# Patient Record
Sex: FEMALE | NOT HISPANIC OR LATINO | Employment: FULL TIME | URBAN - METROPOLITAN AREA
[De-identification: names, ages, dates, MRNs, and addresses within clinical notes are randomized per-mention and may not be internally consistent; named-entity substitution may affect disease eponyms.]

---

## 2017-03-30 DIAGNOSIS — F98.8 ATTENTION DEFICIT DISORDER: ICD-10-CM

## 2017-03-30 NOTE — TELEPHONE ENCOUNTER
Patient is requesting a refill of the following meds:  Pending Prescriptions:                       Disp   Refills    lisdexamfetamine (VYVANSE) 30 MG capsule  30 cap*0            Sig: Take 1 capsule (30 mg) by mouth every morning    lisdexamfetamine (VYVANSE) 30 MG capsule  30 cap*1            Sig: Take 1 capsule (30 mg) by mouth every morning    Last Refill: 11/21/16  Last Office Visit: 11/21/16 (with instructions for a 6 months follow up, pt. Should be ok for refills for the next 2 months)  Scheduled Office Visit: None Scheduled    Please Review and Advise    Thank You,  Amparo Christie, CMA

## 2017-03-31 ENCOUNTER — MYC MEDICAL ADVICE (OUTPATIENT)
Dept: FAMILY MEDICINE | Facility: CLINIC | Age: 37
End: 2017-03-31

## 2017-03-31 DIAGNOSIS — F98.8 ATTENTION DEFICIT DISORDER: ICD-10-CM

## 2017-03-31 RX ORDER — LISDEXAMFETAMINE DIMESYLATE 30 MG/1
30 CAPSULE ORAL EVERY MORNING
Qty: 30 CAPSULE | Refills: 0 | Status: CANCELLED | OUTPATIENT
Start: 2017-04-30 | End: 2017-05-31

## 2017-03-31 NOTE — TELEPHONE ENCOUNTER
I have attempted to contact this patient by phone with the following results: left message to return my call on answering machine.    Patient Call Back Info:  [phone] (home)     Amparo Christie CMA

## 2017-04-01 RX ORDER — LISDEXAMFETAMINE DIMESYLATE 30 MG/1
30 CAPSULE ORAL EVERY MORNING
Qty: 90 CAPSULE | Refills: 0 | Status: SHIPPED | OUTPATIENT
Start: 2017-04-30 | End: 2017-04-03

## 2017-04-01 NOTE — TELEPHONE ENCOUNTER
Faxed to pharmacy - [facility] in [place]  Called Pt and informed her of the fax.   JOHN Win ([facility])

## 2017-04-03 RX ORDER — LISDEXAMFETAMINE DIMESYLATE 30 MG/1
30 CAPSULE ORAL EVERY MORNING
Qty: 90 CAPSULE | Refills: 0 | Status: SHIPPED | OUTPATIENT
Start: 2017-04-30 | End: 2017-08-24

## 2017-04-03 NOTE — TELEPHONE ENCOUNTER
From: Dennys Escudero  Sent: 3/31/2017 10:02 PM CDT  To[facility]  Subject: RE:Refill Request    90 stabs for 3 months  ----- Message -----  From: Amparo Christie CMA  Sent: 3/31/2017 11:55 AM CDT  To: Dennys WHITRajendra Escudero  Subject: Refill Request    Lazaramary,    I received your message requesting a refill of you Vyvanse.  Dr Grover would like to know if you want 90 tabs for a 3 month supply or 3 different prescriptions with a 30 day supply on each one, also equaling 3 months.  Which would you prefer so we can put in the appropriate order.    Thank You,  Amparo Christie CMA

## 2017-04-04 NOTE — TELEPHONE ENCOUNTER
Prescription has been filled.   Pt has been called.   Prescription is in the Red binder.   Riana.JOHN Newman ([facility])

## 2017-08-24 ENCOUNTER — OFFICE VISIT (OUTPATIENT)
Dept: FAMILY MEDICINE | Facility: CLINIC | Age: 37
End: 2017-08-24

## 2017-08-24 VITALS
HEART RATE: 76 BPM | WEIGHT: 169.6 LBS | OXYGEN SATURATION: 99 % | SYSTOLIC BLOOD PRESSURE: 100 MMHG | HEIGHT: 55 IN | BODY MASS INDEX: 39.25 KG/M2 | TEMPERATURE: 98.3 F | DIASTOLIC BLOOD PRESSURE: 72 MMHG

## 2017-08-24 DIAGNOSIS — F90.2 ATTENTION DEFICIT HYPERACTIVITY DISORDER (ADHD), COMBINED TYPE: ICD-10-CM

## 2017-08-24 PROCEDURE — 99213 OFFICE O/P EST LOW 20 MIN: CPT | Performed by: FAMILY MEDICINE

## 2017-08-24 RX ORDER — LISDEXAMFETAMINE DIMESYLATE 30 MG/1
30 CAPSULE ORAL EVERY MORNING
Qty: 90 CAPSULE | Refills: 0 | Status: SHIPPED | OUTPATIENT
Start: 2017-08-24 | End: 2018-01-15

## 2017-08-24 NOTE — PROGRESS NOTES
"SUBJECTIVE:  Dennys Escudero is 37 year old  who is being treated for ADHD.    Are your symptoms controlled?   YES  Are you satisfied with your current medication dosage? YES  Are you taking your medication regularly?YES  Are you experiencing any insomnia? rarely  Are you experiencing any jitteriness? No  Are you experiencing any loss of appetite or weight loss? No  Are you experiencing any other side effects? No     Might take medication on weekends  Takes if she needs to get things done    Likes Vyvanse better than adderall/ less dramatic on and off symptoms  Less \"jittery\" than with other medications    Stopped exercising and has some weight gain  More overtime at work  Healthy lifestyle encouraged        ASSESSMENT:  ADHD- control is good    PLAN:  Refill for 90 days- one refill before next visit in six months  "

## 2017-08-24 NOTE — MR AVS SNAPSHOT
"              After Visit Summary   8/24/2017    Dennys Escudero    MRN: [id number]           Patient Information     Date Of Birth          1980        Visit Information        Provider Department      8/24/2017 11:45 AM Alicia Grover MD [facility]        Today's Diagnoses     Attention deficit hyperactivity disorder (ADHD), combined type           Follow-ups after your visit        Who to contact     If you have questions or need follow up information about today's clinic visit or your schedule please contact [facility] directly at [phone].  Normal or non-critical lab and imaging results will be communicated to you by [employer], letter or phone within 4 business days after the clinic has received the results. If you do not hear from us within 7 days, please contact the clinic through [employer] or phone. If you have a critical or abnormal lab result, we will notify you by phone as soon as possible.  Submit refill requests through [employer] or call your pharmacy and they will forward the refill request to us. Please allow 3 business days for your refill to be completed.          Additional Information About Your Visit        MyChart Information     [employer] gives you secure access to your electronic health record. If you see a primary care provider, you can also send messages to your care team and make appointments. If you have questions, please call your primary care clinic.  If you do not have a primary care provider, please call [phone] and they will assist you.        Care EveryWhere ID     This is your Care EveryWhere ID. This could be used by other organizations to access your [place] medical records  [id number]        Your Vitals Were     Pulse Temperature Height Last Period Pulse Oximetry Breastfeeding?    76 98.3  F (36.8  C) (Oral) 1.321 m (4' 4\") 07/01/2005 99% No    BMI (Body Mass Index)                   44.1 kg/m2            Blood Pressure " from Last 3 Encounters:   08/24/17 100/72   11/21/16 98/70   03/21/16 92/66    Weight from Last 3 Encounters:   08/24/17 76.9 kg (169 lb 9.6 oz)   11/21/16 72.4 kg (159 lb 9.6 oz)   03/21/16 77.3 kg (170 lb 6.4 oz)              Today, you had the following     No orders found for display         Where to get your medicines      Some of these will need a paper prescription and others can be bought over the counter.  Ask your nurse if you have questions.     Bring a paper prescription for each of these medications     lisdexamfetamine 30 MG capsule          Primary Care Provider Office Phone # Fax #    Alicia Grover -[phone] [address] [place] [address]        Equal Access to Services     SUHAIL HOLLAND : Hadii enedina hinojosa hadasho Soomaali, waaxda luqadaha, qaybta kaalmada hany, rosangela rothman . So [facility] [phone].    ATENCIÓN: Si habla español, tiene a lazcano disposición servicios gratuitos de asistencia lingüística. Llame al [phone].    We comply with applicable federal civil rights laws and [place] laws. We do not discriminate on the basis of race, color, national origin, age, disability sex, sexual orientation or gender identity.            Thank you!     Thank you for choosing [facility]  for your care. Our goal is always to provide you with excellent care. Hearing back from our patients is one way we can continue to improve our services. Please take a few minutes to complete the written survey that you may receive in the mail after your visit with us. Thank you!             Your Updated Medication List - Protect others around you: Learn how to safely use, store and throw away your medicines at www.disposemymeds.org.          This list is accurate as of: 8/24/17 11:59 PM.  Always use your most recent med list.                   Brand Name Dispense Instructions for use Diagnosis    lisdexamfetamine 30 MG  capsule    VYVANSE    90 capsule    Take 1 capsule (30 mg) by mouth every morning    Attention deficit hyperactivity disorder (ADHD), combined type

## 2017-08-24 NOTE — NURSING NOTE
Dennys is here for a medication recheck.     Pre-Visit Screening :  Immunizations : up to date    Colonoscopy : NA  Mammogram : NA  Asthma Action Test/Plan : NA  PHQ9/GAD7 :  NA    Pulse - regular  My Chart - accepts    CLASSIFICATION OF OVERWEIGHT AND OBESITY BY BMI                         Obesity Class           BMI(kg/m2)  Underweight                                    < 18.5  Normal                                         18.5-24.9  Overweight                                     25.0-29.9  OBESITY                     I                  30.0-34.9                              II                 35.0-39.9  EXTREME OBESITY             III                >40                             Patient's  BMI There is no height or weight on file to calculate BMI.  http://hin.nhlbi.nih.gov/menuplanner/menu.cgi  Questioned patient about current smoking habits.  Pt. has never smoked.    Riana.JOHN Newman ([facility])

## 2017-12-01 ENCOUNTER — HEALTH MAINTENANCE LETTER (OUTPATIENT)
Age: 37
End: 2017-12-01

## 2018-01-15 DIAGNOSIS — F90.2 ATTENTION DEFICIT HYPERACTIVITY DISORDER (ADHD), COMBINED TYPE: ICD-10-CM

## 2018-01-15 NOTE — TELEPHONE ENCOUNTER
Can you review for BJS    Dennys Escudero is requesting a refill of:    Pending Prescriptions:                       Disp   Refills    lisdexamfetamine (VYVANSE) 30 MG capsule  30 cap*0            Sig: Take 1 capsule (30 mg) by mouth every morning    Pt last OV was 8/24/17  Please advise - [phone]

## 2018-01-16 RX ORDER — LISDEXAMFETAMINE DIMESYLATE 30 MG/1
30 CAPSULE ORAL EVERY MORNING
Qty: 30 CAPSULE | Refills: 0 | Status: SHIPPED | OUTPATIENT
Start: 2018-01-16 | End: 2018-03-15

## 2018-01-19 ENCOUNTER — TELEPHONE (OUTPATIENT)
Dept: FAMILY MEDICINE | Facility: CLINIC | Age: 38
End: 2018-01-19

## 2018-01-19 NOTE — TELEPHONE ENCOUNTER
Dennys called to say she has moved and has trouble getting here to  her Rx.  I spoke with Dennys to let her know it is ok to have a parent or spouse   the Rx.  It is for Vyvanse.  She gave verbal authorization for Sravan Escudero or Luanne Woo to sign.  I did let her know if the script was lost, we will not be able to refill for 30 days.  She verbalized understanding.

## 2018-03-15 ENCOUNTER — OFFICE VISIT (OUTPATIENT)
Dept: FAMILY MEDICINE | Facility: CLINIC | Age: 38
End: 2018-03-15

## 2018-03-15 VITALS
HEART RATE: 76 BPM | BODY MASS INDEX: 28.95 KG/M2 | DIASTOLIC BLOOD PRESSURE: 62 MMHG | HEIGHT: 63 IN | RESPIRATION RATE: 20 BRPM | TEMPERATURE: 97.7 F | SYSTOLIC BLOOD PRESSURE: 96 MMHG | WEIGHT: 163.4 LBS

## 2018-03-15 DIAGNOSIS — Z00.00 ROUTINE HISTORY AND PHYSICAL EXAMINATION OF ADULT: Primary | ICD-10-CM

## 2018-03-15 DIAGNOSIS — J31.0 CHRONIC RHINITIS, UNSPECIFIED TYPE: ICD-10-CM

## 2018-03-15 DIAGNOSIS — E56.9 VITAMIN DEFICIENCY: ICD-10-CM

## 2018-03-15 DIAGNOSIS — F90.2 ATTENTION DEFICIT HYPERACTIVITY DISORDER (ADHD), COMBINED TYPE: ICD-10-CM

## 2018-03-15 LAB
ERYTHROCYTE [DISTWIDTH] IN BLOOD BY AUTOMATED COUNT: 12.1 %
HCT VFR BLD AUTO: 43.3 % (ref 35–47)
HEMOGLOBIN: 14.4 G/DL (ref 11.7–15.7)
MCH RBC QN AUTO: 29.3 PG (ref 26–33)
MCHC RBC AUTO-ENTMCNC: 33.3 G/DL (ref 31–36)
MCV RBC AUTO: 88.1 FL (ref 78–100)
PLATELET COUNT - QUEST: 265 10^9/L (ref 150–375)
RBC # BLD AUTO: 4.92 10*12/L (ref 3.8–5.2)
WBC # BLD AUTO: 9.4 10*9/L (ref 4–11)

## 2018-03-15 PROCEDURE — 82306 VITAMIN D 25 HYDROXY: CPT | Mod: 90 | Performed by: FAMILY MEDICINE

## 2018-03-15 PROCEDURE — 99395 PREV VISIT EST AGE 18-39: CPT | Performed by: FAMILY MEDICINE

## 2018-03-15 PROCEDURE — 36415 COLL VENOUS BLD VENIPUNCTURE: CPT | Performed by: FAMILY MEDICINE

## 2018-03-15 PROCEDURE — 85027 COMPLETE CBC AUTOMATED: CPT | Performed by: FAMILY MEDICINE

## 2018-03-15 RX ORDER — FLUTICASONE PROPIONATE 50 MCG
1-2 SPRAY, SUSPENSION (ML) NASAL DAILY
Qty: 1 BOTTLE | Refills: 11 | Status: SHIPPED | OUTPATIENT
Start: 2018-03-15 | End: 2021-02-02

## 2018-03-15 RX ORDER — LISDEXAMFETAMINE DIMESYLATE 30 MG/1
30 CAPSULE ORAL EVERY MORNING
Qty: 30 CAPSULE | Refills: 0 | Status: SHIPPED | OUTPATIENT
Start: 2018-03-15 | End: 2018-04-25

## 2018-03-15 NOTE — NURSING NOTE
Dennys Escudero is here for a CPX.    Pre-visit planning  Immunizations -up to date  Colonoscopy -  Mammogram -  Asthma test --  PHQ9 -  BRIANNE 7 -    Questioned patient about current smoking habits.  Pt. has never smoked.  Body mass index is 29.41 kg/(m^2).  PULSE regular  My Chart: active  CLASSIFICATION OF OVERWEIGHT AND OBESITY BY BMI                        Obesity Class           BMI(kg/m2)  Underweight                                    < 18.5  Normal                                         18.5-24.9  Overweight                                     25.0-29.9  OBESITY                     I                  30.0-34.9                             II                 35.0-39.9  EXTREME OBESITY             III                >40                            Patient's  BMI Body mass index is 29.41 kg/(m^2).  Http://hin.nhlbi.nih.gov/menuplanner/menu.cgi  ETOH screening:  Questions:  1-How often do you have a drink containing alcohol?                             0 times per   2-How many drinks containing alcohol do you have on a typical day when you are         Drinking?                                 3- How often do you have 5 or more drinks on one occasion?                               per     Have you ever:  None of the patient's responses to the CAGE screening were positive / Negative CAGE score

## 2018-03-15 NOTE — MR AVS SNAPSHOT
After Visit Summary   3/15/2018    Dennys Escudero    MRN: [id number]           Patient Information     Date Of Birth          1980        Visit Information        Provider Department      3/15/2018 8:00 AM Alicia Grover MD [facility]        Today's Diagnoses     Routine history and physical examination of adult    -  1    Vitamin deficiency        Attention deficit hyperactivity disorder (ADHD), combined type        Chronic rhinitis, unspecified type          Care Instructions    GO[employer] RX-/ check for pricing          Follow-ups after your visit        Who to contact     If you have questions or need follow up information about today's clinic visit or your schedule please contact [facility] directly at [phone].  Normal or non-critical lab and imaging results will be communicated to you by MyChart, letter or phone within 4 business days after the clinic has received the results. If you do not hear from us within 7 days, please contact the clinic through [employer] or phone. If you have a critical or abnormal lab result, we will notify you by phone as soon as possible.  Submit refill requests through [email or web address] or call your pharmacy and they will forward the refill request to us. Please allow 3 business days for your refill to be completed.          Additional Information About Your Visit        MyChart Information     [email or web address] gives you secure access to your electronic health record. If you see a primary care provider, you can also send messages to your care team and make appointments. If you have questions, please call your primary care clinic.  If you do not have a primary care provider, please call [phone] and they will assist you.        Care EveryWhere ID     This is your Care EveryWhere ID. This could be used by other organizations to access your [place] medical records  [id number]        Your Vitals Were     Pulse Temperature  "Respirations Height Last Period BMI (Body Mass Index)    76 97.7  F (36.5  C) (Oral) 20 1.588 m (5' 2.5\") 07/01/2005 29.41 kg/m2       Blood Pressure from Last 3 Encounters:   03/15/18 96/62   08/24/17 100/72   11/21/16 98/70    Weight from Last 3 Encounters:   03/15/18 74.1 kg (163 lb 6.4 oz)   08/24/17 76.9 kg (169 lb 9.6 oz)   11/21/16 72.4 kg (159 lb 9.6 oz)              We Performed the Following     HEMOGRAM/PLATELET (BFP)     VENOUS COLLECTION     Vitamin D Deficiency          Today's Medication Changes          These changes are accurate as of 3/15/18  8:45 AM.  If you have any questions, ask your nurse or doctor.               Start taking these medicines.        Dose/Directions    fluticasone 50 MCG/ACT spray   Commonly known as:  FLONASE   Used for:  Chronic rhinitis, unspecified type   Started by:  Alicia Grover MD        Dose:  1-2 spray   Spray 1-2 sprays into both nostrils daily   Quantity:  1 Bottle   Refills:  11            Where to get your medicines      These medications were sent to [place] [address] [place] [address] [address]     Phone:  [phone]     fluticasone 50 MCG/ACT spray         Some of these will need a paper prescription and others can be bought over the counter.  Ask your nurse if you have questions.     Bring a paper prescription for each of these medications     lisdexamfetamine 30 MG capsule                Primary Care Provider Office Phone # Fax #    Alicia Grover -[phone] [address] [address]        Equal Access to Services     DENILSON HOLLAND AH: Van Esquivel, john pond, carmel fariasalmanay leach, rosangela kilgore. So [facility] [phone].    ATENCIÓN: Si habla español, tiene a lazcano disposición servicios gratuitos de asistencia lingüística. Llarlene al [phone].    We comply with applicable federal civil rights laws " and [place] laws. We do not discriminate on the basis of race, color, national origin, age, disability, sex, sexual orientation, or gender identity.            Thank you!     Thank you for choosing [facility]  for your care. Our goal is always to provide you with excellent care. Hearing back from our patients is one way we can continue to improve our services. Please take a few minutes to complete the written survey that you may receive in the mail after your visit with us. Thank you!             Your Updated Medication List - Protect others around you: Learn how to safely use, store and throw away your medicines at www.disposemymeds.org.          This list is accurate as of 3/15/18  8:45 AM.  Always use your most recent med list.                   Brand Name Dispense Instructions for use Diagnosis    fluticasone 50 MCG/ACT spray    FLONASE    1 Bottle    Spray 1-2 sprays into both nostrils daily    Chronic rhinitis, unspecified type       lisdexamfetamine 30 MG capsule    VYVANSE    30 capsule    Take 1 capsule (30 mg) by mouth every morning    Attention deficit hyperactivity disorder (ADHD), combined type

## 2018-03-15 NOTE — PROGRESS NOTES
Chief Complaint: Dennys Escudero is an 37 year old woman who presents for preventive health visit.      Besides routine health maintenance,  she would like to discuss :.     Has a cough: fever for two days late January, followed by a cough  Much better, but still has a residual cough, stuffy nose  No history of asthma    Not taking vitamin D- history of insufficient D    Refill of Vyvanse-  For ADHD  Insurance does not pay    Has tried ritalin, adderall-  heart palpitations, jittery.  Tolerates vyvanse  better, better sleep  Works from home- often will not take on weekends          Are your symptoms controlled?   YES  Are you satisfied with your current medication dosage? YES  Are you taking your medication regularly?YES  Are you experiencing any insomnia? No  Are you experiencing any jitteriness? No  Are you experiencing any loss of appetite or weight loss? No  Are you experiencing any other side effects? No        ASSESSMENT:  ADHD- control is good            Healthy Habits:  Do you get at least three servings of calcium containing foods daily (dairy, green leafy vegetables, etc.)? yes  16-8 diet/ two and a half meals- started late December- eats 8 hour window  Outside of work or daily activities, how many days per week do you exercise for 30 minutes or longer? umba- regular exercise classes- dancing/ walking dog/ fit bit/ not snacking  Have you had an eye exam in the past two years? Not addressed  Do you see a dentist twice per year? yes    PHQ-2  Over the last two weeks- Have you been bothered by little interest or pleasure in doing things?  No  Over the last two weeks- Have you been feeling down, depressed, or hopeless?  No     has a therapist- when she has situational stress- low mood    Advanced directive: encouraged    Social History   Substance Use Topics     Smoking status: Never Smoker     Smokeless tobacco: Not on file     Alcohol use No         Reviewed orders with patient.  Reviewed health maintenance  "and updated orders accordingly - Yes      History of abnormal Pap smear: Status post benign hysterectomy. Health Maintenance and Surgical History updated.  All Histories reviewed and updated in Epic.      ROS:  C: NEGATIVE for fever, chills, change in weight  I: NEGATIVE for worrisome rashes, moles or lesions  E: NEGATIVE for vision changes or irritation  ENT: POSITIVE for stuffed nose, residual cough- history of mold allergy  R: NEGATIVE for significant cough or SOB  B: NEGATIVE for masses, tenderness or discharge  CV: NEGATIVE for chest pain, palpitations or peripheral edema  GI: NEGATIVE for nausea, abdominal pain, heartburn, or change in bowel habits  : NEGATIVE for unusual urinary or vaginal symptoms. Periods are regular.  M: NEGATIVE for significant arthralgias or myalgia  N: NEGATIVE for weakness, dizziness or paresthesias  P: NEGATIVE for changes in mood or affect      OBJECTIVE:  BP 96/62 (BP Location: Right arm, Patient Position: Chair, Cuff Size: Adult Regular)  Pulse 76  Temp 97.7  F (36.5  C) (Oral)  Resp 20  Ht 1.588 m (5' 2.5\")  Wt 74.1 kg (163 lb 6.4 oz)  LMP 07/01/2005  BMI 29.41 kg/m2  General appearance: Healthy    Skin: Normal. No atypical appearing moles on inspection of trunk and extremities.    External ears  and canals clear bilaterally. TM's normal bilaterally. Nose normal without lesions or discharge. Oropharynx normal. Neck supple without palpable adenopathy.    Breasts are symmetric.  No dominant, discrete, fixed  or suspicious masses are noted.  No skin or nipple changes or axillary nodes.     Regular rate and  rhythm. S1 and S2 normal, no murmurs, clicks, gallops or rubs. No edema or JVD. Chest is clear; no wheezes or rales.    The abdomen is soft without tenderness, guarding, mass or organomegaly. Bowel sounds are normal. No CVA tenderness or inguinal adenopathy noted.    Pelvic:  Vagina and vulva are normal.   No palpable adnexal masses or tenderness.  Pap deferred    Rectal " exam:deferred    Extremities: negative.      COUNSELING:  Reviewed preventive health counseling, as reflected in patient instructions  Special attention given to:        Regular exercise       Healthy diet/nutrition       Advance Care Planning       discussion of menopause- post hysterectomy    ATP III Guidelines  ICSI Preventive Guidelines    ASSESSMENT/PLAN:  (Z00.00) Routine history and physical examination of adult  (primary encounter diagnosis)  Comment:   Plan: HEMOGRAM/PLATELET (BFP), VENOUS COLLECTION,         Vitamin D Deficiency            (E56.9) Vitamin deficiency  Comment:   Plan: VENOUS COLLECTION, Vitamin D Deficiency            (F90.2) Attention deficit hyperactivity disorder (ADHD), combined type  Comment:   Plan: lisdexamfetamine (VYVANSE) 30 MG capsule            (J31.0) Chronic rhinitis, unspecified type  Comment:   Plan: fluticasone (FLONASE) 50 MCG/ACT spray

## 2018-03-16 ENCOUNTER — TELEPHONE (OUTPATIENT)
Dept: PHARMACY | Facility: PHYSICIAN GROUP | Age: 38
End: 2018-03-16

## 2018-03-16 LAB — VITAMIN D, 25-OH, TOTAL - QUEST: 13 NG/ML (ref 30–100)

## 2018-03-16 NOTE — TELEPHONE ENCOUNTER
Called patient to discuss possible formulary matter regarding a medication she has concerns about with the cost.  Left vm message

## 2018-04-25 DIAGNOSIS — F90.2 ATTENTION DEFICIT HYPERACTIVITY DISORDER (ADHD), COMBINED TYPE: ICD-10-CM

## 2018-04-25 RX ORDER — LISDEXAMFETAMINE DIMESYLATE 30 MG/1
30 CAPSULE ORAL EVERY MORNING
Qty: 30 CAPSULE | Refills: 0 | Status: SHIPPED | OUTPATIENT
Start: 2018-05-26 | End: 2018-06-25

## 2018-04-25 RX ORDER — LISDEXAMFETAMINE DIMESYLATE 30 MG/1
30 CAPSULE ORAL EVERY MORNING
Qty: 30 CAPSULE | Refills: 0 | Status: SHIPPED | OUTPATIENT
Start: 2018-06-26 | End: 2018-07-26

## 2018-04-25 RX ORDER — LISDEXAMFETAMINE DIMESYLATE 30 MG/1
30 CAPSULE ORAL EVERY MORNING
Qty: 30 CAPSULE | Refills: 0 | Status: SHIPPED | OUTPATIENT
Start: 2018-04-25 | End: 2018-05-25

## 2018-04-25 NOTE — TELEPHONE ENCOUNTER
Called patient and let her know prescriptions were ready for . She is giving permission for her parents Sravan Escudero and Luanne Woo to be able to pick the prescriptions up due to them being closer and more convenient. Patient had no further questions or concerns.

## 2018-04-25 NOTE — TELEPHONE ENCOUNTER
Dennys called clinic support today asking why is she only get 30 day supplies of her vyvanse at a time when she used to get 3 months.  Pt is also requesting another rx, and her last CPX was 3-15-18.    Pending: requesting longer rx  Pending Prescriptions:                       Disp   Refills    lisdexamfetamine (VYVANSE) 30 MG capsule  30 cap*0            Sig: Take 1 capsule (30 mg) by mouth every morning    lisdexamfetamine (VYVANSE) 30 MG capsule  30 cap*0            Sig: Take 1 capsule (30 mg) by mouth every morning    lisdexamfetamine (VYVANSE) 30 MG capsule  30 cap*0            Sig: Take 1 capsule (30 mg) by mouth every morning        Pt can be reached at [phone]    Please advise

## 2018-08-21 ENCOUNTER — TELEPHONE (OUTPATIENT)
Dept: FAMILY MEDICINE | Facility: CLINIC | Age: 38
End: 2018-08-21

## 2018-08-21 NOTE — TELEPHONE ENCOUNTER
Patient called in and left a message asking if she was due for an office visit to come in for her Vyvanse refills or if she is able to  her refills from the clinical support desk. Based off her chart, last visit for medication check was on 3/15/2018 and she does not have anymore refills. In order to get next 3 months, patient should come in to be seen. I called her and left a message for her to call us back to inform her to schedule an appointment.

## 2018-08-21 NOTE — TELEPHONE ENCOUNTER
Spoke to patient and she scheduled an appointment for this Saturday 8/25/18 with Dr. Grover for Vyvanse refills.

## 2018-08-25 ENCOUNTER — OFFICE VISIT (OUTPATIENT)
Dept: FAMILY MEDICINE | Facility: CLINIC | Age: 38
End: 2018-08-25

## 2018-08-25 VITALS
WEIGHT: 161 LBS | OXYGEN SATURATION: 99 % | HEART RATE: 65 BPM | DIASTOLIC BLOOD PRESSURE: 62 MMHG | SYSTOLIC BLOOD PRESSURE: 102 MMHG | TEMPERATURE: 98.6 F | BODY MASS INDEX: 28.98 KG/M2

## 2018-08-25 DIAGNOSIS — R05.3 CHRONIC COUGH: Primary | ICD-10-CM

## 2018-08-25 DIAGNOSIS — F98.8 ATTENTION DEFICIT DISORDER (ADD) WITHOUT HYPERACTIVITY: ICD-10-CM

## 2018-08-25 PROCEDURE — 99214 OFFICE O/P EST MOD 30 MIN: CPT | Performed by: FAMILY MEDICINE

## 2018-08-25 RX ORDER — LISDEXAMFETAMINE DIMESYLATE 30 MG/1
30 CAPSULE ORAL EVERY MORNING
Qty: 90 CAPSULE | Refills: 0 | Status: SHIPPED | OUTPATIENT
Start: 2018-08-25 | End: 2019-01-24

## 2018-08-25 RX ORDER — MONTELUKAST SODIUM 10 MG/1
10 TABLET ORAL AT BEDTIME
Qty: 90 TABLET | Refills: 1 | Status: SHIPPED | OUTPATIENT
Start: 2018-08-25 | End: 2019-01-24

## 2018-08-25 NOTE — PROGRESS NOTES
"SUBJECTIVE:  Dennys Escudero is 38 year old  who is being treated for ADHD.    Are your symptoms controlled?   YES  Are you satisfied with your current medication dosage? YES  Are you taking your medication regularly?YES- work days  Often does not dose on weekends or on vacation    Are you experiencing any insomnia? No  Are you experiencing any jitteriness? No  Are you experiencing any loss of appetite or weight loss? ten pound weight loss, intentional   Some decreased appetite- she is following a \"fasting\" diet-16 hours of not eating-often does not eat until late in afternoon- nothing but coffee  in the morning    Are you experiencing any other side effects? No   Exercise: walking, has a dog,     Other concerns:   Has had a cough in March- using flonase , but cough has not gone away  History of mold allergy   no fever or chills  No shortness of breath  Cough is not worse at night or when reclined  No heartburn symptoms    OBJECTIVE:  /62 (BP Location: Left arm, Patient Position: Sitting, Cuff Size: Adult Regular)  Pulse 65  Temp 98.6  F (37  C) (Oral)  Wt 73 kg (161 lb)  LMP 07/01/2005  SpO2 99%  BMI 28.98 kg/m2   External ears  and canals clear bilaterally. TM's normal bilaterally. Nose : mild congestion- no discharge. Oropharynx normal. Neck supple without palpable adenopathy.  Regular rate and  rhythm. S1 and S2 normal, no murmurs, clicks, gallops or rubs. No edema or JVD. Chest is clear; no wheezes or rales.          ASSESSMENT:  ADHD- control is good  Continue Vyvanse- recheck in six months    Chronic cough- trial of montelukast  Offered chest x-ray- she defers- will follow up if cough persists for further evaluation     "

## 2018-08-25 NOTE — MR AVS SNAPSHOT
After Visit Summary   8/25/2018    Dennys Escudero    MRN: [id number]           Patient Information     Date Of Birth          1980        Visit Information        Provider Department      8/25/2018 9:45 AM Alicia Grover MD [facility]        Today's Diagnoses     Chronic cough    -  1    Attention deficit disorder (ADD) without hyperactivity           Follow-ups after your visit        Who to contact     If you have questions or need follow up information about today's clinic visit or your schedule please contact [facility] directly at [phone].  Normal or non-critical lab and imaging results will be communicated to you by [facility], letter or phone within 4 business days after the clinic has received the results. If you do not hear from us within 7 days, please contact the clinic through [facility] or phone. If you have a critical or abnormal lab result, we will notify you by phone as soon as possible.  Submit refill requests through [facility] or call your pharmacy and they will forward the refill request to us. Please allow 3 business days for your refill to be completed.          Additional Information About Your Visit        MyChart Information     [facility] gives you secure access to your electronic health record. If you see a primary care provider, you can also send messages to your care team and make appointments. If you have questions, please call your primary care clinic.  If you do not have a primary care provider, please call [phone] and they will assist you.        Care EveryWhere ID     This is your Care EveryWhere ID. This could be used by other organizations to access your [place] medical records  [id number]        Your Vitals Were     Pulse Temperature Last Period Pulse Oximetry BMI (Body Mass Index)       65 98.6  F (37  C) (Oral) 07/01/2005 99% 28.98 kg/m2        Blood Pressure from Last 3 Encounters:   08/25/18 102/62    03/15/18 96/62   08/24/17 100/72    Weight from Last 3 Encounters:   08/25/18 73 kg (161 lb)   03/15/18 74.1 kg (163 lb 6.4 oz)   08/24/17 76.9 kg (169 lb 9.6 oz)              Today, you had the following     No orders found for display         Today's Medication Changes          These changes are accurate as of 8/25/18 11:24 AM.  If you have any questions, ask your nurse or doctor.               Start taking these medicines.        Dose/Directions    lisdexamfetamine 30 MG capsule   Commonly known as:  VYVANSE   Used for:  Attention deficit disorder (ADD) without hyperactivity   Started by:  Alicia Grover MD        Dose:  30 mg   Take 1 capsule (30 mg) by mouth every morning   Quantity:  90 capsule   Refills:  0       montelukast 10 MG tablet   Commonly known as:  SINGULAIR   Used for:  Chronic cough   Started by:  Alicia Grover MD        Dose:  10 mg   Take 1 tablet (10 mg) by mouth At Bedtime   Quantity:  90 tablet   Refills:  1            Where to get your medicines      Some of these will need a paper prescription and others can be bought over the counter.  Ask your nurse if you have questions.     Bring a paper prescription for each of these medications     lisdexamfetamine 30 MG capsule    montelukast 10 MG tablet                Primary Care Provider Office Phone # Fax #    Alicia Grover [phone] [address] [address]        Equal Access to Services     [facility] AH: Hadii enedina lopezo Soelaine, waaxda luqadaha, qaybta kaalmada adeegyada, rosangela kilgore. So [facility] [phone].    ATENCIÓN: Si habla español, tiene a lazcano disposición servicios gratuitos de asistencia lingüística. Llame al [phone].    We comply with applicable federal civil rights laws and [place] laws. We do not discriminate on the basis of race, color, national origin, age, disability, sex, sexual orientation, or gender identity.             Thank you!     Thank you for choosing [facility]  for your care. Our goal is always to provide you with excellent care. Hearing back from our patients is one way we can continue to improve our services. Please take a few minutes to complete the written survey that you may receive in the mail after your visit with us. Thank you!             Your Updated Medication List - Protect others around you: Learn how to safely use, store and throw away your medicines at www.disposemymeds.org.          This list is accurate as of 8/25/18 11:24 AM.  Always use your most recent med list.                   Brand Name Dispense Instructions for use Diagnosis    cholecalciferol 76679 units capsule    VITAMIN D3    12 capsule    Take 1 capsule (50,000 Units) by mouth once a week    Vitamin deficiency       fluticasone 50 MCG/ACT spray    FLONASE    1 Bottle    Spray 1-2 sprays into both nostrils daily    Chronic rhinitis, unspecified type       lisdexamfetamine 30 MG capsule    VYVANSE    90 capsule    Take 1 capsule (30 mg) by mouth every morning    Attention deficit disorder (ADD) without hyperactivity       montelukast 10 MG tablet    SINGULAIR    90 tablet    Take 1 tablet (10 mg) by mouth At Bedtime    Chronic cough

## 2018-08-25 NOTE — NURSING NOTE
Dennys is here for Vyvanse medication check and refill.      Pre-visit Screening:  Immunizations:  up to date  Colonoscopy: NA  Mammogram: NA  Asthma Action Test/Plan:  No concerns  PHQ9:  PHQ-2 done today   GAD7:  NA  Questioned patient about current smoking habits Pt. has never smoked.  Ok to leave detailed message on voice mail for today's visit only Yes, phone # [phone]

## 2019-01-24 ENCOUNTER — OFFICE VISIT (OUTPATIENT)
Dept: FAMILY MEDICINE | Facility: CLINIC | Age: 39
End: 2019-01-24

## 2019-01-24 VITALS
OXYGEN SATURATION: 99 % | SYSTOLIC BLOOD PRESSURE: 106 MMHG | HEART RATE: 80 BPM | TEMPERATURE: 98.3 F | BODY MASS INDEX: 29.52 KG/M2 | DIASTOLIC BLOOD PRESSURE: 64 MMHG | WEIGHT: 164 LBS

## 2019-01-24 DIAGNOSIS — R05.3 CHRONIC COUGH: Primary | ICD-10-CM

## 2019-01-24 DIAGNOSIS — F98.8 ATTENTION DEFICIT DISORDER (ADD) WITHOUT HYPERACTIVITY: ICD-10-CM

## 2019-01-24 PROCEDURE — 99214 OFFICE O/P EST MOD 30 MIN: CPT | Performed by: FAMILY MEDICINE

## 2019-01-24 RX ORDER — LISDEXAMFETAMINE DIMESYLATE 30 MG/1
30 CAPSULE ORAL EVERY MORNING
Qty: 90 CAPSULE | Refills: 0 | Status: SHIPPED | OUTPATIENT
Start: 2019-01-24 | End: 2020-03-09

## 2019-01-24 NOTE — NURSING NOTE
Patient is here for medication check and refill for Vyvanse.     Pre-visit Screening:  Immunizations:  up to date  Colonoscopy:    Mammogram: NA  Asthma Action Test/Plan:  No concerns  PHQ9:  NA  GAD7:  NA  Questioned patient about current smoking habits Pt. has never smoked.  Ok to leave detailed message on voice mail for today's visit only Yes, phone # [phone]

## 2019-01-24 NOTE — PROGRESS NOTES
SUBJECTIVE:  Dennys Escudero is 38 year old  who is being treated for ADHD.    Are your symptoms controlled?   YES  Are you satisfied with your current medication dosage? YES  Are you taking your medication regularly?sometimes takes weekends- more efficient in tasks   Takes work days  Are you experiencing any insomnia? No   No longer a problem - dosing medication earlier  Are you experiencing any jitteriness? No  Are you experiencing any loss of appetite or weight loss? Yes   Fasting diet: 2 pm is first meal of the day, and also eats dinner  Weight is stable-    Are you experiencing any other side effects? No     Other concerns:  Cough improved (see last note), but only temporarily with montelukast  Worse  again when she was ill with an ear infection- treated with azithromycin- mid December   while the ear pain is better, the cough persists  She denies shortness of breath    Patient Active Problem List   Diagnosis     Other acne     Attention deficit disorder     Anxiety state     Health Care Home     Adjustment reaction     ACP (advance care planning)     Seasonal allergic rhinitis     Past Surgical History:   Procedure Laterality Date     ARTHROSCOPY OF JOINT UNLISTED      patellar tendon      SECTION      post partum hemorrage, hysterectomy     HC CREATE EARDRUM OPENING,GEN ANESTH      P.E. Tubes     Current Outpatient Medications   Medication     cholecalciferol (VITAMIN D3) 98876 UNITS capsule     fluticasone (FLONASE) 50 MCG/ACT spray     lisdexamfetamine (VYVANSE) 30 MG capsule     No current facility-administered medications for this visit.       ROS: 10 point ROS neg other than the symptoms noted above in the HPI.    OBJECTIVE:  /64 (BP Location: Left arm, Patient Position: Sitting, Cuff Size: Adult Regular)   Pulse 80   Temp 98.3  F (36.8  C) (Oral)   Wt 74.4 kg (164 lb)   LMP 2005   SpO2 99%   BMI 29.52 kg/m    External ears  and canals clear bilaterally. TM's normal  bilaterally. Nose: mild congestion.. Oropharynx normal. Neck supple without palpable adenopathy.  Chest is clear- now wheezing    ASSESSMENT:  ADHD- control is good  Chronic cough    PLAN:  Continue Vyvanse 30 mg capsule  Follow up  6 months. Patient will come in immediately if any new concerns.    Trial of  breo 200/25  (samples): once a day for 28 days  She will call with an update   Offered a trial of PPI- she prefers a trial of inhaler  Failed trial of montelukast  Failed trial of flonase  Offered chest x-ray deferred in August  If cough responds, RX for 100/25 to be sent to her pharmacy  Other considerations- allergy consult  (she also has questions about her penicillin allergy- can that be confirmed?)    RECHECK IN SIX MONTHS

## 2019-06-03 DIAGNOSIS — F98.8 ATTENTION DEFICIT DISORDER (ADD) WITHOUT HYPERACTIVITY: Primary | ICD-10-CM

## 2019-06-03 NOTE — TELEPHONE ENCOUNTER
Dennys is calling about a refill of her Vyvanse, however, she stated she wants to speak to a nurse regarding her dosage first.  I LM on VM to call back and speak with anyone to let us know what she wants.  Awaiting return call

## 2019-06-05 RX ORDER — LISDEXAMFETAMINE DIMESYLATE 30 MG/1
30 CAPSULE ORAL DAILY
Qty: 30 CAPSULE | Refills: 0 | Status: SHIPPED | OUTPATIENT
Start: 2019-06-05 | End: 2019-10-21

## 2019-06-05 RX ORDER — LISDEXAMFETAMINE DIMESYLATE 30 MG/1
30 CAPSULE ORAL DAILY
Qty: 30 CAPSULE | Refills: 0 | Status: SHIPPED | OUTPATIENT
Start: 2019-08-06 | End: 2019-10-21

## 2019-06-05 RX ORDER — LISDEXAMFETAMINE DIMESYLATE 30 MG/1
30 CAPSULE ORAL DAILY
Qty: 30 CAPSULE | Refills: 0 | Status: SHIPPED | OUTPATIENT
Start: 2019-07-06 | End: 2019-10-21

## 2019-06-05 NOTE — TELEPHONE ENCOUNTER
Dennys called back and is requesting to have a 30 day supply for 3 months rather than written as 90 quantity. This is so that she can use a [employer] coupon every 30 days.      Pending Prescriptions:                       Disp   Refills    lisdexamfetamine (VYVANSE) 30 MG capsule  30 cap*0            Sig: Take 1 capsule (30 mg) by mouth daily    lisdexamfetamine (VYVANSE) 30 MG capsule  30 cap*0            Sig: Take 1 capsule (30 mg) by mouth daily    lisdexamfetamine (VYVANSE) 30 MG capsule  30 cap*0            Sig: Take 1 capsule (30 mg) by mouth daily        Lazaramary's number: [phone]

## 2019-09-27 ENCOUNTER — HEALTH MAINTENANCE LETTER (OUTPATIENT)
Age: 39
End: 2019-09-27

## 2019-10-21 ENCOUNTER — OFFICE VISIT (OUTPATIENT)
Dept: FAMILY MEDICINE | Facility: CLINIC | Age: 39
End: 2019-10-21

## 2019-10-21 VITALS
WEIGHT: 160 LBS | SYSTOLIC BLOOD PRESSURE: 100 MMHG | OXYGEN SATURATION: 98 % | BODY MASS INDEX: 28.8 KG/M2 | DIASTOLIC BLOOD PRESSURE: 60 MMHG | HEART RATE: 83 BPM | TEMPERATURE: 98.3 F

## 2019-10-21 DIAGNOSIS — F98.8 ATTENTION DEFICIT DISORDER (ADD) WITHOUT HYPERACTIVITY: ICD-10-CM

## 2019-10-21 PROCEDURE — 99213 OFFICE O/P EST LOW 20 MIN: CPT | Performed by: FAMILY MEDICINE

## 2019-10-21 RX ORDER — LISDEXAMFETAMINE DIMESYLATE 30 MG/1
30 CAPSULE ORAL DAILY
Qty: 30 CAPSULE | Refills: 0 | Status: SHIPPED | OUTPATIENT
Start: 2019-11-21 | End: 2019-12-21

## 2019-10-21 RX ORDER — LISDEXAMFETAMINE DIMESYLATE 30 MG/1
30 CAPSULE ORAL DAILY
Qty: 30 CAPSULE | Refills: 0 | Status: SHIPPED | OUTPATIENT
Start: 2019-10-21 | End: 2019-11-20

## 2019-10-21 RX ORDER — LISDEXAMFETAMINE DIMESYLATE 30 MG/1
30 CAPSULE ORAL DAILY
Qty: 30 CAPSULE | Refills: 0 | Status: SHIPPED | OUTPATIENT
Start: 2019-12-22 | End: 2020-07-13

## 2019-10-21 NOTE — NURSING NOTE
Chief Complaint   Patient presents with     Recheck Medication     fasting, medication refill     Pre-visit Screening:  Immunizations:  up to date  Colonoscopy:  NA  Mammogram: NA  Asthma Action Test/Plan:  NA  PHQ9:  PHQ-2 given, nothing abnormal   GAD7:  NA  Questioned patient about current smoking habits Pt. has never smoked.  Ok to leave detailed message on voice mail for today's visit only yes, phone # [phone]

## 2020-03-09 DIAGNOSIS — F98.8 ATTENTION DEFICIT DISORDER (ADD) WITHOUT HYPERACTIVITY: ICD-10-CM

## 2020-03-09 RX ORDER — LISDEXAMFETAMINE DIMESYLATE 30 MG/1
30 CAPSULE ORAL EVERY MORNING
Qty: 90 CAPSULE | Refills: 0 | Status: SHIPPED | OUTPATIENT
Start: 2020-03-09 | End: 2022-07-22

## 2020-03-09 NOTE — TELEPHONE ENCOUNTER
Patient called in and is requesting a refill of   Pending Prescriptions:                       Disp   Refills    lisdexamfetamine (VYVANSE) 30 MG capsule  90 cap*0            Sig: Take 1 capsule (30 mg) by mouth every morning    She stated that she knows this will be her last call in for this refill to Dr. Grover but she was told by Dr. Grover to call in for her refill in march of 90 days. Routing to Dr. Grover for review.

## 2020-07-11 NOTE — PROGRESS NOTES
ADD-ADHD Follow Up    SUBJECTIVE:  Dennys Escudero is a 40 year old  female who is being treated for Attention Deficit Disorder.    Patient new to me, former Dr. Grover patient, establishing care with me today.  We reviewed patients PHM, medications, Care Gaps, Current Chronic issues.     Patient Active Problem List   Diagnosis     Other acne     Attention deficit disorder     Anxiety [facility]     Health Care Home     Adjustment reaction     ACP (advance care planning)     Seasonal allergic rhinitis       Hysterectomy Last Pap 2014 - not sure if cervix present -removed in [place]  Hysterectomy 2010 - after delivery    Vit D def - winter time      Dg at 15  - Inattentive   Vyvanse - working well   Taking 8-9 am   Wearing off 7-8 pm.       Are your symptoms controlled?   YES  Are you satisfied with your current medication dosage? YES  Are you taking your medication regularly?YES  Are you experiencing any insomnia? No  Are you experiencing any jitteriness? No  Are you experiencing any loss of appetite or weight loss? No  Are you experiencing any other side effects? No    Wt Readings from Last 5 Encounters:   07/13/20 79.8 kg (176 lb)   10/21/19 72.6 kg (160 lb)   01/24/19 74.4 kg (164 lb)   08/25/18 73 kg (161 lb)   03/15/18 74.1 kg (163 lb 6.4 oz)       Other concerns: none    ROS:    E/M: NEGATIVE for ear, mouth and throat problems  R: NEGATIVE for significant cough or SOB  CV: NEGATIVE for chest pain or palpitations   GI: NEGATIVE for nausea, vomiting, abdominal pain, diarrhea or constipation         No past medical history on file.  Family History   Problem Relation Age of Onset     Arthritis Mother         rheumatoid arthritis     Gastrointestinal Disease Father         ulcers     Chronic Obstructive Pulmonary Disease Maternal Grandmother      Parkinsonism Paternal Grandmother      Heart Disease No family hx of      Cancer No family hx of      Diabetes No family hx of      Social History     Socioeconomic History      Marital status:      Spouse name: Not on file     Number of children: Not on file     Years of education: Not on file     Highest education level: Not on file   Occupational History     Not on file   Social Needs     Financial resource strain: Not on file     Food insecurity     Worry: Not on file     Inability: Not on file     Transportation needs     Medical: Not on file     Non-medical: Not on file   Tobacco Use     Smoking status: Never Smoker     Smokeless tobacco: Never Used   Substance and Sexual Activity     Alcohol use: No     Drug use: No     Sexual activity: Yes     Partners: Male     Birth control/protection: Condom   Lifestyle     Physical activity     Days per week: Not on file     Minutes per session: Not on file     Stress: Not on file   Relationships     Social connections     Talks on phone: Not on file     Gets together: Not on file     Attends [employer] service: Not on file     Active member of club or organization: Not on file     Attends meetings of clubs or organizations: Not on file     Relationship status: Not on file     Intimate partner violence     Fear of current or ex partner: Not on file     Emotionally abused: Not on file     Physically abused: Not on file     Forced sexual activity: Not on file   Other Topics Concern     Not on file   Social History Narrative     Not on file     Patient Active Problem List   Diagnosis     Other acne     Attention deficit disorder     Anxiety state     Health Care Home     Adjustment reaction     ACP (advance care planning)     Seasonal allergic rhinitis     Current Outpatient Medications   Medication     lisdexamfetamine (VYVANSE) 30 MG capsule     cholecalciferol (VITAMIN D3) 96236 UNITS capsule     fluticasone (FLONASE) 50 MCG/ACT spray     No current facility-administered medications for this visit.         Allergies:  Allergies   Allergen Reactions     Estrogens      causing anxiety attack around menstrual time     Penicillin G   "      OBJECTIVE  Vitals:    07/13/20 1101   BP: 102/58   BP Location: Right arm   Patient Position: Sitting   Cuff Size: Adult Large   Pulse: 98   Temp: 97.8  F (36.6  C)   TempSrc: Oral   SpO2: (!) 86%   Weight: 79.8 kg (176 lb)   Height: 1.575 m (5' 2\")        PHYSICAL EXAMINATION    Patient is a 40 year old female, in no acute distress. Vitals noted   Cardiac:  Normal rhythm and rate, no murmurs, rubs or gallops.  Lungs: clear to auscultation bilaterally; no wheezes, crackles or rhonchi      ASSESSMENT/PLAN:      1. Attention deficit disorder (ADD) without hyperactivity    - lisdexamfetamine (VYVANSE) 30 MG capsule; Take 1 capsule (30 mg) by mouth daily  Dispense: 30 capsule; Refill: 0  - lisdexamfetamine (VYVANSE) 30 MG capsule; Take 1 capsule (30 mg) by mouth daily  Dispense: 30 capsule; Refill: 0  - lisdexamfetamine (VYVANSE) 30 MG capsule; Take 1 capsule (30 mg) by mouth daily  Dispense: 30 capsule; Refill: 0    ADHD- control is good  Continue current medication dosing.    Follow up by My Chart in 3 month(s), patient agrees to come in to office every 6 months. Patient will come in immediately if any new concerns.        Taylor Hall PA-C  7/10/2020        "

## 2020-07-13 ENCOUNTER — OFFICE VISIT (OUTPATIENT)
Dept: FAMILY MEDICINE | Facility: CLINIC | Age: 40
End: 2020-07-13

## 2020-07-13 VITALS
SYSTOLIC BLOOD PRESSURE: 102 MMHG | WEIGHT: 176 LBS | TEMPERATURE: 97.8 F | HEIGHT: 62 IN | HEART RATE: 98 BPM | OXYGEN SATURATION: 86 % | DIASTOLIC BLOOD PRESSURE: 58 MMHG | BODY MASS INDEX: 32.39 KG/M2

## 2020-07-13 DIAGNOSIS — F98.8 ATTENTION DEFICIT DISORDER (ADD) WITHOUT HYPERACTIVITY: ICD-10-CM

## 2020-07-13 PROCEDURE — 99213 OFFICE O/P EST LOW 20 MIN: CPT | Performed by: PHYSICIAN ASSISTANT

## 2020-07-13 RX ORDER — LISDEXAMFETAMINE DIMESYLATE 30 MG/1
30 CAPSULE ORAL EVERY MORNING
Qty: 30 CAPSULE | Refills: 5 | Status: CANCELLED | OUTPATIENT
Start: 2020-07-13

## 2020-07-13 RX ORDER — LISDEXAMFETAMINE DIMESYLATE 30 MG/1
30 CAPSULE ORAL DAILY
Qty: 30 CAPSULE | Refills: 0 | Status: SHIPPED | OUTPATIENT
Start: 2020-08-13 | End: 2020-09-12

## 2020-07-13 RX ORDER — LISDEXAMFETAMINE DIMESYLATE 30 MG/1
30 CAPSULE ORAL DAILY
Qty: 30 CAPSULE | Refills: 0 | Status: SHIPPED | OUTPATIENT
Start: 2020-07-13 | End: 2020-08-12

## 2020-07-13 RX ORDER — LISDEXAMFETAMINE DIMESYLATE 30 MG/1
30 CAPSULE ORAL DAILY
Qty: 30 CAPSULE | Refills: 0 | Status: SHIPPED | OUTPATIENT
Start: 2020-09-13 | End: 2020-10-13

## 2020-07-13 ASSESSMENT — MIFFLIN-ST. JEOR: SCORE: 1421.58

## 2020-07-13 NOTE — NURSING NOTE
Dennys is here for a med check.        Pre-visit Screening:  Immunizations:  up to date  Colonoscopy:  NA  Mammogram: is up to date  Asthma Action Test/Plan:  NA  PHQ9:  None  GAD7:  None  Questioned patient about current smoking habits Pt. has never smoked.  Ok to leave detailed message on voice mail for today's visit only Yes, phone # [phone]

## 2020-09-01 ENCOUNTER — TELEPHONE (OUTPATIENT)
Dept: FAMILY MEDICINE | Facility: CLINIC | Age: 40
End: 2020-09-01

## 2020-09-01 NOTE — TELEPHONE ENCOUNTER
Dennys called to say she had an infected ingrown hair in her armpit.  She was taking a shower and pulled out the hair and while doing so a lot of pus came out.  She was wondering if she needed an antibiotic.  I told her to hot pack it, put an OTC antibiotic on it, keep it clean and dry and if she sees it turn red, inflamed, warm to the touch, then she needs to call to schedule an appointment to come in.  Patient verbalized understanding

## 2020-12-11 DIAGNOSIS — F98.8 ATTENTION DEFICIT DISORDER (ADD) WITHOUT HYPERACTIVITY: Primary | ICD-10-CM

## 2020-12-11 RX ORDER — LISDEXAMFETAMINE DIMESYLATE 30 MG/1
30 CAPSULE ORAL DAILY
Qty: 30 CAPSULE | Refills: 0 | Status: SHIPPED | OUTPATIENT
Start: 2021-02-11 | End: 2021-03-13

## 2020-12-11 RX ORDER — LISDEXAMFETAMINE DIMESYLATE 30 MG/1
30 CAPSULE ORAL DAILY
Qty: 30 CAPSULE | Refills: 0 | Status: SHIPPED | OUTPATIENT
Start: 2020-12-11 | End: 2021-01-10

## 2020-12-11 RX ORDER — LISDEXAMFETAMINE DIMESYLATE 30 MG/1
30 CAPSULE ORAL DAILY
Qty: 30 CAPSULE | Refills: 0 | Status: SHIPPED | OUTPATIENT
Start: 2021-01-11 | End: 2021-02-10

## 2020-12-11 NOTE — TELEPHONE ENCOUNTER
Pt called asking for her next 90 day supply of Vyvanse. Pt last seen 07/13/20, should be due back in January. Spoke with pt she does not take this RX on weekends or anytime she is not working, therefore a 90 day prescription lasts her much longer than normal. She stated [facility] always let her have a full 6 months of refills before asking her to come in. Please advise, she is aware you are out until Monday.    Dennys Escudero is requesting a refill of:    Pending Prescriptions:                       Disp   Refills    lisdexamfetamine (VYVANSE) 30 MG capsule  30 cap*0            Sig: Take 1 capsule (30 mg) by mouth daily    lisdexamfetamine (VYVANSE) 30 MG capsule  30 cap*0            Sig: Take 1 capsule (30 mg) by mouth daily    lisdexamfetamine (VYVANSE) 30 MG capsule  30 cap*0            Sig: Take 1 capsule (30 mg) by mouth daily

## 2021-01-09 ENCOUNTER — HEALTH MAINTENANCE LETTER (OUTPATIENT)
Age: 41
End: 2021-01-09

## 2021-02-01 NOTE — PROGRESS NOTES
ADD-ADHD Follow Up    SUBJECTIVE:  Dennys Escudero is a 40 year old  female who is being treated for Attention Deficit Disorder.       Dg at 15  - Inattentive   Vyvanse - working well   Taking 8-9 am   Wearing off 7-8 pm.   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        Are your symptoms controlled?   YES - Taking during weekends.. No weekends or vacation.   Are you satisfied with your current medication dosage? YES  Are you taking your medication regularly?YES  Are you experiencing any insomnia?YES  Are you experiencing any jitteriness? No  Are you experiencing any loss of appetite or weight loss? No  Are you experiencing any other side effects? No    Wt Readings from Last 5 Encounters:   02/02/21 82.9 kg (182 lb 12.8 oz)   07/13/20 79.8 kg (176 lb)   10/21/19 72.6 kg (160 lb)   01/24/19 74.4 kg (164 lb)   08/25/18 73 kg (161 lb)       Other concerns:     Emergency hysterectomy when daughter was born 10 years ago  C section initially    Trouble falling asleep with anxiety      ROS:    E/M: NEGATIVE for ear, mouth and throat problems  R: NEGATIVE for significant cough or SOB  CV: NEGATIVE for chest pain or palpitations   GI: NEGATIVE for nausea, vomiting, abdominal pain, diarrhea or constipation         No past medical history on file.  Family History   Problem Relation Age of Onset     Arthritis Mother         rheumatoid arthritis     Gastrointestinal Disease Father         ulcers     Chronic Obstructive Pulmonary Disease Maternal Grandmother      Parkinsonism Paternal Grandmother      Heart Disease No family hx of      Cancer No family hx of      Diabetes No family hx of      Social History     Socioeconomic History     Marital status:      Spouse name: Not on file     Number of children: Not on file     Years of education: Not on file     Highest education level: Not on file   Occupational History      Not on file   Social Needs     Financial resource strain: Not on file     Food insecurity     Worry: Not on file     Inability: Not on file     Transportation needs     Medical: Not on file     Non-medical: Not on file   Tobacco Use     Smoking status: Never Smoker     Smokeless tobacco: Never Used   Substance and Sexual Activity     Alcohol use: No     Drug use: No     Sexual activity: Yes     Partners: Male     Birth control/protection: Condom   Lifestyle     Physical activity     Days per week: Not on file     Minutes per session: Not on file     Stress: Not on file   Relationships     Social connections     Talks on phone: Not on file     Gets together: Not on file     Attends Zoroastrianism service: Not on file     Active member of club or organization: Not on file     Attends meetings of clubs or organizations: Not on file     Relationship status: Not on file     Intimate partner violence     Fear of current or ex partner: Not on file     Emotionally abused: Not on file     Physically abused: Not on file     Forced sexual activity: Not on file   Other Topics Concern     Not on file   Social History Narrative     Not on file     Patient Active Problem List   Diagnosis     Other acne     Attention deficit hyperactivity disorder (ADHD), predominantly inattentive type     Anxiety state     Health Care Home     Adjustment reaction     ACP (advance care planning)     Seasonal allergic rhinitis     Insomnia, unspecified type     Current Outpatient Medications   Medication     cholecalciferol (VITAMIN D3) 34486 UNITS capsule     lisdexamfetamine (VYVANSE) 30 MG capsule     [START ON 3/5/2021] lisdexamfetamine (VYVANSE) 30 MG capsule     [START ON 4/5/2021] lisdexamfetamine (VYVANSE) 30 MG capsule     lisdexamfetamine (VYVANSE) 30 MG capsule     [START ON 2/11/2021] lisdexamfetamine (VYVANSE) 30 MG capsule     lisdexamfetamine (VYVANSE) 30 MG capsule     No current facility-administered medications for this visit.        "  Allergies:  Allergies   Allergen Reactions     Estrogens      causing anxiety attack around menstrual time     Penicillin G        OBJECTIVE  Vitals:    02/02/21 0830   BP: 100/60   BP Location: Left arm   Patient Position: Sitting   Cuff Size: Adult Large   Pulse: 84   Temp: 97.7  F (36.5  C)   TempSrc: Oral   SpO2: 99%   Weight: 82.9 kg (182 lb 12.8 oz)   Height: 1.575 m (5' 2\")        PHYSICAL EXAMINATION    Patient is a 40 year old female, in no acute distress. Vitals noted   Head: Normocephalic, atraumatic.  Neck:  Neck supple, No lymphadenopathy, no thyromegaly.  Cardiac:  Normal rhythm and rate, no murmurs, rubs or gallops.  Lungs: clear to auscultation bilaterally; no wheezes, crackles or rhonchi      ASSESSMENT/PLAN:      1. Attention deficit hyperactivity disorder (ADHD), predominantly inattentive type    - lisdexamfetamine (VYVANSE) 30 MG capsule; Take 1 capsule (30 mg) by mouth daily  Dispense: 30 capsule; Refill: 0  - lisdexamfetamine (VYVANSE) 30 MG capsule; Take 1 capsule (30 mg) by mouth daily  Dispense: 30 capsule; Refill: 0  - lisdexamfetamine (VYVANSE) 30 MG capsule; Take 1 capsule (30 mg) by mouth daily  Dispense: 30 capsule; Refill: 0    2. Insomnia, unspecified type  Sent pt info on insomnia      ADHD- control is good  Continue current medication dosing.    Follow up by My Chart in 3 month(s), patient agrees to come in to office every 6 months. Patient will come in immediately if any new concerns.    SCHEDULE FASTING CPE 6 MONTHS    Taylor Hall PA-C  2/1/2021        "

## 2021-02-02 ENCOUNTER — OFFICE VISIT (OUTPATIENT)
Dept: FAMILY MEDICINE | Facility: CLINIC | Age: 41
End: 2021-02-02

## 2021-02-02 VITALS
HEIGHT: 62 IN | BODY MASS INDEX: 33.64 KG/M2 | OXYGEN SATURATION: 99 % | DIASTOLIC BLOOD PRESSURE: 60 MMHG | SYSTOLIC BLOOD PRESSURE: 100 MMHG | WEIGHT: 182.8 LBS | HEART RATE: 84 BPM | TEMPERATURE: 97.7 F

## 2021-02-02 DIAGNOSIS — F90.0 ATTENTION DEFICIT HYPERACTIVITY DISORDER (ADHD), PREDOMINANTLY INATTENTIVE TYPE: Primary | ICD-10-CM

## 2021-02-02 DIAGNOSIS — G47.00 INSOMNIA, UNSPECIFIED TYPE: ICD-10-CM

## 2021-02-02 PROCEDURE — 99213 OFFICE O/P EST LOW 20 MIN: CPT | Mod: 25 | Performed by: PHYSICIAN ASSISTANT

## 2021-02-02 PROCEDURE — 90471 IMMUNIZATION ADMIN: CPT | Performed by: PHYSICIAN ASSISTANT

## 2021-02-02 PROCEDURE — 90686 IIV4 VACC NO PRSV 0.5 ML IM: CPT | Performed by: PHYSICIAN ASSISTANT

## 2021-02-02 RX ORDER — LISDEXAMFETAMINE DIMESYLATE 30 MG/1
30 CAPSULE ORAL DAILY
Qty: 30 CAPSULE | Refills: 0 | Status: SHIPPED | OUTPATIENT
Start: 2021-03-05 | End: 2021-04-04

## 2021-02-02 RX ORDER — LISDEXAMFETAMINE DIMESYLATE 30 MG/1
30 CAPSULE ORAL DAILY
Qty: 30 CAPSULE | Refills: 0 | Status: SHIPPED | OUTPATIENT
Start: 2021-04-05 | End: 2021-05-05

## 2021-02-02 RX ORDER — LISDEXAMFETAMINE DIMESYLATE 30 MG/1
30 CAPSULE ORAL DAILY
Qty: 30 CAPSULE | Refills: 0 | Status: SHIPPED | OUTPATIENT
Start: 2021-02-02 | End: 2021-03-04

## 2021-02-02 ASSESSMENT — PATIENT HEALTH QUESTIONNAIRE - PHQ9
5. POOR APPETITE OR OVEREATING: MORE THAN HALF THE DAYS
SUM OF ALL RESPONSES TO PHQ QUESTIONS 1-9: 5

## 2021-02-02 ASSESSMENT — ANXIETY QUESTIONNAIRES
1. FEELING NERVOUS, ANXIOUS, OR ON EDGE: NOT AT ALL
5. BEING SO RESTLESS THAT IT IS HARD TO SIT STILL: NOT AT ALL
IF YOU CHECKED OFF ANY PROBLEMS ON THIS QUESTIONNAIRE, HOW DIFFICULT HAVE THESE PROBLEMS MADE IT FOR YOU TO DO YOUR WORK, TAKE CARE OF THINGS AT HOME, OR GET ALONG WITH OTHER PEOPLE: SOMEWHAT DIFFICULT
6. BECOMING EASILY ANNOYED OR IRRITABLE: MORE THAN HALF THE DAYS
7. FEELING AFRAID AS IF SOMETHING AWFUL MIGHT HAPPEN: NOT AT ALL
GAD7 TOTAL SCORE: 4
3. WORRYING TOO MUCH ABOUT DIFFERENT THINGS: NOT AT ALL
2. NOT BEING ABLE TO STOP OR CONTROL WORRYING: NOT AT ALL

## 2021-02-02 ASSESSMENT — MIFFLIN-ST. JEOR: SCORE: 1452.43

## 2021-02-02 NOTE — NURSING NOTE
Dennys is here for a fasting med check.    Pre-Visit Screening:  Immunizations:Flu shot  Colonoscopy:NA  Mammogram:NA  Asthma Action Test/Plan:NA  PHQ9:today  GAD7:today  Questioned patient about current smoking habits Pt.never smoked  OK to leave a detailed message on voice mail for today's visit yes, phone # [phone]

## 2021-02-03 ASSESSMENT — ANXIETY QUESTIONNAIRES: GAD7 TOTAL SCORE: 4

## 2021-06-17 ENCOUNTER — OFFICE VISIT (OUTPATIENT)
Dept: FAMILY MEDICINE | Facility: CLINIC | Age: 41
End: 2021-06-17

## 2021-06-17 VITALS
SYSTOLIC BLOOD PRESSURE: 110 MMHG | HEART RATE: 102 BPM | DIASTOLIC BLOOD PRESSURE: 64 MMHG | BODY MASS INDEX: 33.22 KG/M2 | OXYGEN SATURATION: 97 % | TEMPERATURE: 97.9 F | WEIGHT: 181.6 LBS

## 2021-06-17 DIAGNOSIS — Z23 NEED FOR VACCINATION: ICD-10-CM

## 2021-06-17 DIAGNOSIS — F90.0 ATTENTION DEFICIT HYPERACTIVITY DISORDER (ADHD), PREDOMINANTLY INATTENTIVE TYPE: Primary | ICD-10-CM

## 2021-06-17 DIAGNOSIS — F41.1 ANXIETY STATE: ICD-10-CM

## 2021-06-17 DIAGNOSIS — G47.00 INSOMNIA, UNSPECIFIED TYPE: ICD-10-CM

## 2021-06-17 PROCEDURE — 99213 OFFICE O/P EST LOW 20 MIN: CPT | Mod: 25 | Performed by: PHYSICIAN ASSISTANT

## 2021-06-17 PROCEDURE — 90714 TD VACC NO PRESV 7 YRS+ IM: CPT | Performed by: PHYSICIAN ASSISTANT

## 2021-06-17 PROCEDURE — 90471 IMMUNIZATION ADMIN: CPT | Performed by: PHYSICIAN ASSISTANT

## 2021-06-17 RX ORDER — LISDEXAMFETAMINE DIMESYLATE 30 MG/1
30 CAPSULE ORAL DAILY
Qty: 30 CAPSULE | Refills: 0 | Status: SHIPPED | OUTPATIENT
Start: 2021-08-18 | End: 2021-09-01

## 2021-06-17 RX ORDER — LISDEXAMFETAMINE DIMESYLATE 30 MG/1
30 CAPSULE ORAL DAILY
Qty: 30 CAPSULE | Refills: 0 | Status: SHIPPED | OUTPATIENT
Start: 2021-06-17 | End: 2021-07-17

## 2021-06-17 RX ORDER — LISDEXAMFETAMINE DIMESYLATE 30 MG/1
30 CAPSULE ORAL DAILY
Qty: 30 CAPSULE | Refills: 0 | Status: SHIPPED | OUTPATIENT
Start: 2021-07-18 | End: 2021-08-17

## 2021-06-17 ASSESSMENT — ANXIETY QUESTIONNAIRES
GAD7 TOTAL SCORE: 9
5. BEING SO RESTLESS THAT IT IS HARD TO SIT STILL: NOT AT ALL
7. FEELING AFRAID AS IF SOMETHING AWFUL MIGHT HAPPEN: NOT AT ALL
2. NOT BEING ABLE TO STOP OR CONTROL WORRYING: MORE THAN HALF THE DAYS
IF YOU CHECKED OFF ANY PROBLEMS ON THIS QUESTIONNAIRE, HOW DIFFICULT HAVE THESE PROBLEMS MADE IT FOR YOU TO DO YOUR WORK, TAKE CARE OF THINGS AT HOME, OR GET ALONG WITH OTHER PEOPLE: SOMEWHAT DIFFICULT
6. BECOMING EASILY ANNOYED OR IRRITABLE: MORE THAN HALF THE DAYS
1. FEELING NERVOUS, ANXIOUS, OR ON EDGE: SEVERAL DAYS
3. WORRYING TOO MUCH ABOUT DIFFERENT THINGS: MORE THAN HALF THE DAYS

## 2021-06-17 ASSESSMENT — PATIENT HEALTH QUESTIONNAIRE - PHQ9
SUM OF ALL RESPONSES TO PHQ QUESTIONS 1-9: 10
5. POOR APPETITE OR OVEREATING: MORE THAN HALF THE DAYS

## 2021-06-17 NOTE — PROGRESS NOTES
ADD-ADHD Follow Up    SUBJECTIVE:  Dennys Escudero is a 41 year old  female who is being treated for Attention Deficit Disorder.    Are your symptoms controlled?   YES  Are you satisfied with your current medication dosage? YES  Are you taking your medication regularly?YES  Are you experiencing any insomnia?   Are you experiencing any jitteriness? No  Are you experiencing any loss of appetite or weight loss? No  Are you experiencing any other side effects? No     Just taking days working.   Daughter distance learning all year.  Daughter may have ADD.     Wt Readings from Last 5 Encounters:   06/17/21 82.4 kg (181 lb 9.6 oz)   02/02/21 82.9 kg (182 lb 12.8 oz)   07/13/20 79.8 kg (176 lb)   10/21/19 72.6 kg (160 lb)   01/24/19 74.4 kg (164 lb)       Anxiety in the past.  Insomnia - doxylamine didn't help  Gummies to help her sleep (from [place])      Vit D OTC: taking [employer] - been off for a while.              History reviewed. No pertinent past medical history.  Family History   Problem Relation Age of Onset     Arthritis Mother         rheumatoid arthritis     Gastrointestinal Disease Father         ulcers     Chronic Obstructive Pulmonary Disease Maternal Grandmother      Parkinsonism Paternal Grandmother      Heart Disease No family hx of      Cancer No family hx of      Diabetes No family hx of      Social History     Socioeconomic History     Marital status:      Spouse name: Not on file     Number of children: Not on file     Years of education: Not on file     Highest education level: Not on file   Occupational History     Not on file   Social Needs     Financial resource strain: Not on file     Food insecurity     Worry: Not on file     Inability: Not on file     Transportation needs     Medical: Not on file     Non-medical: Not on file   Tobacco Use     Smoking status: Never Smoker     Smokeless tobacco: Never Used   Substance and Sexual Activity     Alcohol use: No     Drug use: No     Sexual activity:  Yes     Partners: Male     Birth control/protection: Condom   Lifestyle     Physical activity     Days per week: Not on file     Minutes per session: Not on file     Stress: Not on file   Relationships     Social connections     Talks on phone: Not on file     Gets together: Not on file     Attends Hindu service: Not on file     Active member of club or organization: Not on file     Attends meetings of clubs or organizations: Not on file     Relationship status: Not on file     Intimate partner violence     Fear of current or ex partner: Not on file     Emotionally abused: Not on file     Physically abused: Not on file     Forced sexual activity: Not on file   Other Topics Concern     Not on file   Social History Narrative     Not on file     Patient Active Problem List   Diagnosis     Other acne     Attention deficit hyperactivity disorder (ADHD), predominantly inattentive type     Anxiety state     Health Care Home     Adjustment reaction     ACP (advance care planning)     Seasonal allergic rhinitis     Insomnia, unspecified type     Need for vaccination     Current Outpatient Medications   Medication     cholecalciferol (VITAMIN D3) 35518 UNITS capsule     lisdexamfetamine (VYVANSE) 30 MG capsule     No current facility-administered medications for this visit.         Allergies:  Allergies   Allergen Reactions     Estrogens      causing anxiety attack around menstrual time     Penicillin G        OBJECTIVE  Vitals:    06/17/21 1554   BP: 110/64   BP Location: Right arm   Patient Position: Sitting   Cuff Size: Adult Large   Pulse: 102   Temp: 97.9  F (36.6  C)   SpO2: 97%   Weight: 82.4 kg (181 lb 9.6 oz)        PHYSICAL EXAMINATION    Patient is a 41 year old female, in no acute distress. Vitals noted   Head: Normocephalic, atraumatic.  Eyes: Conjunctiva clear.  No discharge noted.  Ears: External ears, canals and TMs normal Bilaterally: gray and translucent.   Nose: Normal without discharge.  Mouth /  Throat:   Pharynx non-erythematous, no exudates present, tonsils without hypertrophy. Mucous membranes moist.  Neck:  Neck supple, No lymphadenopathy, no thyromegaly.  Cardiac:  Normal rhythm and rate, no murmurs, rubs or gallops.  Lungs: clear to auscultation bilaterally; no wheezes, crackles or rhonchi      ASSESSMENT/PLAN:      1. Need for vaccination    - TD, PF, 7 + YRS    2. Attention deficit hyperactivity disorder (ADHD), predominantly inattentive type  controlled  - lisdexamfetamine (VYVANSE) 30 MG capsule; Take 1 capsule (30 mg) by mouth daily  Dispense: 30 capsule; Refill: 0  - lisdexamfetamine (VYVANSE) 30 MG capsule; Take 1 capsule (30 mg) by mouth daily  Dispense: 30 capsule; Refill: 0  - lisdexamfetamine (VYVANSE) 30 MG capsule; Take 1 capsule (30 mg) by mouth daily  Dispense: 30 capsule; Refill: 0    3. Anxiety state  stable    4. Insomnia, unspecified type  Info given.  Advised against cannabis  Declined trazodone at this time    Controlled substance agreement signed    See me in 3 months fasting CASSIE Hall PA-C  6/17/2021

## 2021-06-17 NOTE — NURSING NOTE
Dennys is here for a med check.    Pre-Visit Screening:  Immunizations:TD  Colonoscopy:AN  Mammogram:NA  Asthma Action Test/Plan:NA  PHQ9:AN  GAD7:NA  Questioned patient about current smoking habits Pt.never  OK to leave a detailed message on voice mail for today's visit yes, phone # [phone]  ACP-dicussed

## 2021-06-17 NOTE — LETTER
[facility]          [address], [address]  [place], [place] [address]  [phone]  Fax [phone]    06/17/21    Patient: Dennys Escudero  YOB: 1980  Medical Record Number: [id number]                                                Controlled Substance Agreement  lisdexamfetamine (VYVANSE) 30 MG capsule  I understand that my care provider has prescribed controlled substances (narcotics, tranquilizers, and/or stimulants) to help manage my condition(s).  I am taking this medicine to help me function or work.  I know that this is strong medicine.  It could have serious side effects and even cause a dependency on the drug.  If I stop these medicines suddenly, I could have severe withdrawal symptoms.    The risks, benefits, and side effects of these medication(s) were explained to me.  I agree that:  1. I will take part in other treatments as advised by my provider.  This may be psychiatry or counseling, physical therapy, behavioral therapy, group treatment, or a referral to a pain clinic.  I will reduce or stop my medicine when my provider tells me to do so.   2. I will take my medicines as prescribed.  I will not change the dose or schedule unless my provider tells me to.  There will be no refills if I  run out early.   I may be contacted at any time without warning and asked to complete a drug test or pill count.   3. I will keep all my appointments at the clinic.  If I miss appointments or fail to follow instructions, my provider may stop my medicine.  4. I will not ask other providers to prescribe controlled substances. And I will not accept controlled substances from other people. If I need another prescribed controlled substance for a new reason, I will notify my provider within one business day.  5. If I enroll in the [place] Medical Marijuana program, I will tell my provider.  I will also sign an agreement to share my medical records with my provider.  6. I will use one  pharmacy to fill all of my controlled substance prescriptions.  If my prescription is mailed to my pharmacy, it may take 5 to 7 days for my medicine to be ready.  7. I understand that my provider, clinic care team, and pharmacy can track controlled substance prescriptions from other providers through a central database (prescription monitoring program).  8. I will bring in my list of medications (or my medicine bottles) each time I come to the clinic.  REV- 04/2016                                                                                                               Page  1 of 2    [facility]    06/17/21    Patient: Dennys Escudero  YOB: 1980  Medical Record Number: [id number]    9. Refills of controlled substances will be made only during office hours.  It is up to me to make sure that I do not run out of my medicines on weekends or holidays.    10. I am responsible for my prescriptions.  If the medicine is lost or stolen, it will not be replaced.   I also agree not to share these medicines with anyone.  11. I agree to not use ANY illegal or recreational drugs.  This includes marijuana, cocaine, bath salts or other drugs.  I agree not to use alcohol unless my provider says I may.  I agree to give urine samples whenever asked.  If I fail to give a urine sample, the provider may stop my medicine.     12. I will tell my nurse or provider right away if I become pregnant or have a new medical problem treated outside of [facility].  13. I understand that this medicine can affect my thinking and judgment.  It may be unsafe for me to drive, use machinery and do dangerous tasks.  I will not do any of these things until I know how the medicine affects me.  If my dose changes, I will wait to see how it affects me.  I will contact my provider if I have concerns about medicine side effects.  I understand that if I do not follow any of the conditions above, my prescriptions or treatment  may be stopped.    I agree that my provider, clinic care team, and pharmacy may work with any city, state or federal law enforcement agency that investigates the misuse, sale, or other diversion of my controlled medicine. I will allow my provider to discuss my care with or share a copy of this agreement with any other treating provider, pharmacy or emergency room where I receive care.  I agree to give up (waive) any right of privacy or confidentiality with respect to these authorizations.   I have read this agreement and have asked questions about anything I did not understand.   ___________________________________    ___________________________  Patient Signature                                                           Date and Time  ___________________________________     ____________________________  Witness                                                                            Date and Time  ___________________________________  DEMOND Snowden  REV-  04/2016                                                                                                                                                                 Page 2 of 2

## 2021-06-18 ASSESSMENT — ANXIETY QUESTIONNAIRES: GAD7 TOTAL SCORE: 9

## 2021-09-01 ENCOUNTER — OFFICE VISIT (OUTPATIENT)
Dept: FAMILY MEDICINE | Facility: CLINIC | Age: 41
End: 2021-09-01

## 2021-09-01 VITALS
WEIGHT: 178 LBS | HEART RATE: 85 BPM | RESPIRATION RATE: 19 BRPM | HEIGHT: 62 IN | BODY MASS INDEX: 32.76 KG/M2 | SYSTOLIC BLOOD PRESSURE: 116 MMHG | TEMPERATURE: 98.2 F | DIASTOLIC BLOOD PRESSURE: 78 MMHG | OXYGEN SATURATION: 100 %

## 2021-09-01 DIAGNOSIS — G47.00 INSOMNIA, UNSPECIFIED TYPE: ICD-10-CM

## 2021-09-01 DIAGNOSIS — F41.1 ANXIETY STATE: ICD-10-CM

## 2021-09-01 DIAGNOSIS — Z01.419 ENCOUNTER FOR GYNECOLOGICAL EXAMINATION WITHOUT ABNORMAL FINDING: Primary | ICD-10-CM

## 2021-09-01 DIAGNOSIS — F90.0 ATTENTION DEFICIT HYPERACTIVITY DISORDER (ADHD), PREDOMINANTLY INATTENTIVE TYPE: ICD-10-CM

## 2021-09-01 DIAGNOSIS — E66.811 OBESITY (BMI 30.0-34.9): ICD-10-CM

## 2021-09-01 LAB
CHOLEST SERPL-MCNC: 206 MG/DL (ref 0–199)
CHOLEST/HDLC SERPL: 3 {RATIO} (ref 0–5)
GLUCOSE SERPL-MCNC: 97 MG/DL (ref 60–99)
HDLC SERPL-MCNC: 59 MG/DL (ref 40–150)
LDLC SERPL CALC-MCNC: 115 MG/DL (ref 0–130)
TRIGL SERPL-MCNC: 159 MG/DL (ref 0–149)

## 2021-09-01 PROCEDURE — 82947 ASSAY GLUCOSE BLOOD QUANT: CPT | Performed by: PHYSICIAN ASSISTANT

## 2021-09-01 PROCEDURE — 99396 PREV VISIT EST AGE 40-64: CPT | Performed by: PHYSICIAN ASSISTANT

## 2021-09-01 PROCEDURE — 80061 LIPID PANEL: CPT | Performed by: PHYSICIAN ASSISTANT

## 2021-09-01 PROCEDURE — 36415 COLL VENOUS BLD VENIPUNCTURE: CPT | Performed by: PHYSICIAN ASSISTANT

## 2021-09-01 ASSESSMENT — MIFFLIN-ST. JEOR: SCORE: 1425.65

## 2021-09-01 NOTE — PROGRESS NOTES
SUBJECTIVE:   CC: Dennys Escudero is an 41 year old woman who presents for preventive health visit.         Patient has been advised of split billing requirements and indicates understanding: Yes     HPI     Work days only or if busy weekend    ADD  Are your symptoms controlled?   YES  Are you satisfied with your current medication dosage? NO  Are you taking your medication regularly?NO  Are you experiencing any insomnia? yes  Are you experiencing any jitteriness? No  Are you experiencing any loss of appetite or weight loss? No  Are you experiencing any other side effects? No    Would like to start anxiety medication      Today's PHQ-2 Score:   PHQ-2 ( 1999 Pfizer) 6/17/2021   Q1: Little interest or pleasure in doing things 0   Q2: Feeling down, depressed or hopeless 0   PHQ-2 Score 0       Abuse: Current or Past (Physical, Sexual or Emotional) - No  Do you feel safe in your environment? Yes        Social History     Tobacco Use     Smoking status: Never Smoker     Smokeless tobacco: Never Used   Substance Use Topics     Alcohol use: Yes     Alcohol/week: 2.0 standard drinks     Types: 2 Standard drinks or equivalent per week     Reviewed orders with patient.  Reviewed health maintenance and updated orders accordingly - Yes  Lab work is in process  Labs reviewed in EPIC  BP Readings from Last 3 Encounters:   09/01/21 116/78   06/17/21 110/64   02/02/21 100/60    Wt Readings from Last 3 Encounters:   09/01/21 80.7 kg (178 lb)   06/17/21 82.4 kg (181 lb 9.6 oz)   02/02/21 82.9 kg (182 lb 12.8 oz)                  Patient Active Problem List   Diagnosis     Other acne     Attention deficit hyperactivity disorder (ADHD), predominantly inattentive type     Anxiety state     Health Care Home     Adjustment reaction     ACP (advance care planning)     Seasonal allergic rhinitis     Insomnia, unspecified type     Need for vaccination     Past Surgical History:   Procedure Laterality Date     ARTHROSCOPY OF JOINT  UNLISTED Left 2000    patellar tendon      SECTION      post partum hemorrage, hysterectomy     HYSTERECTOMY      w/o oophorectomy     ZZ CREATE EARDRUM OPENING,GEN ANESTH      P.E. Tubes       Social History     Tobacco Use     Smoking status: Never Smoker     Smokeless tobacco: Never Used   Substance Use Topics     Alcohol use: Yes     Alcohol/week: 2.0 standard drinks     Types: 2 Standard drinks or equivalent per week     Family History   Problem Relation Age of Onset     Rheumatoid Arthritis Mother      Gastrointestinal Disease Father         ulcers     Chronic Obstructive Pulmonary Disease Maternal Grandmother      Parkinsonism Paternal Grandmother      Rheumatoid Arthritis Maternal Uncle      Heart Disease No family hx of      Cancer No family hx of      Diabetes No family hx of          Current Outpatient Medications   Medication Sig Dispense Refill     cholecalciferol (VITAMIN D3) 28614 UNITS capsule Take 1 capsule (50,000 Units) by mouth once a week 12 capsule 0     lisdexamfetamine (VYVANSE) 30 MG capsule Take 1 capsule (30 mg) by mouth every morning 90 capsule 0     Allergies   Allergen Reactions     Estrogens      causing anxiety attack around menstrual time     Penicillin G      Recent Labs   Lab Test 16  0850   LDL 74   HDL 52   TRIG 120        Breast Cancer Screening:      Mammogram Screening - Offered annual screening and updated Health Maintenance for [place] plan based on risk factor consideration    Pertinent mammograms are reviewed under the imaging tab.    History of abnormal Pap smear: Status post benign hysterectomy. Health Maintenance and Surgical History updated.        History reviewed. No pertinent past medical history.   Past Surgical History:   Procedure Laterality Date     ARTHROSCOPY OF JOINT UNLISTED Left     patellar tendon      SECTION      post partum hemorrage, hysterectomy     HYSTERECTOMY      w/o oophorectomy     ZZHC CREATE EARDRUM OPENING,GEN  "ANESTH      P.E. Tubes       Review of Systems  CONSTITUTIONAL: NEGATIVE for fever, chills, change in weight  INTEGUMENTARU/SKIN: NEGATIVE for worrisome rashes, moles or lesions  EYES: NEGATIVE for vision changes or irritation  ENT: NEGATIVE for ear, mouth and throat problems  RESP: NEGATIVE for significant cough or SOB  BREAST: NEGATIVE for masses, tenderness or discharge  CV: NEGATIVE for chest pain, palpitations or peripheral edema  GI: NEGATIVE for nausea, abdominal pain, heartburn, or change in bowel habits  : NEGATIVE for unusual urinary or vaginal symptoms. Periods are regular.  MUSCULOSKELETAL: NEGATIVE for significant arthralgias or myalgia  NEURO: NEGATIVE for weakness, dizziness or paresthesias  PSYCHIATRIC: NEGATIVE for changes in mood or affect     OBJECTIVE:   /78 (BP Location: Right arm, Patient Position: Sitting, Cuff Size: Adult Large)   Pulse 85   Temp 98.2  F (36.8  C) (Oral)   Resp 19   Ht 1.575 m (5' 2\")   Wt 80.7 kg (178 lb)   LMP 07/01/2005   SpO2 100%   BMI 32.56 kg/m    Physical Exam     GENERAL: healthy, alert and no distress  EYES: Eyes grossly normal to inspection, PERRL and conjunctivae and sclerae normal  HENT: ear canals and TM's normal, nose and mouth without ulcers or lesions  NECK: no adenopathy, no asymmetry, masses, or scars and thyroid normal to palpation  RESP: lungs clear to auscultation - no rales, rhonchi or wheezes  BREAST: normal without masses, tenderness or nipple discharge and no palpable axillary masses or adenopathy  CV: regular rate and rhythm, normal S1 S2, no S3 or S4, no murmur, click or rub, no peripheral edema and peripheral pulses strong  ABDOMEN: soft, nontender, no hepatosplenomegaly, no masses and bowel sounds normal   (female): normal female external genitalia, normal urethral meatus, vaginal mucosa pink, moist, well rugated, and  Absent cervix. Ovaries non-palpable  MS: no gross musculoskeletal defects noted, no edema  SKIN: no suspicious " "lesions or rashes  NEURO: Normal strength and tone, mentation intact and speech normal  PSYCH: mentation appears normal, affect normal/bright    Diagnostic Test Results:  Labs reviewed in Epic    ASSESSMENT/PLAN:   Dennys was seen today for physical.    Diagnoses and all orders for this visit:    Encounter for gynecological examination without abnormal finding  -     VENOUS COLLECTION  -     Lipid Panel (BFP)  -     Glucose Fasting (BFP)    Attention deficit hyperactivity disorder (ADHD), predominantly inattentive type  OK refills 6 months    Anxiety state  Virtual visit to be scheduled to discuss    Insomnia, unspecified type    Obesity (BMI 30.0-34.9)  -     VENOUS COLLECTION  -     Lipid Panel (BFP)  -     Glucose Fasting (BFP)        Patient has been advised of split billing requirements and indicates understanding: Yes  COUNSELING:  Reviewed preventive health counseling, as reflected in patient instructions    Estimated body mass index is 32.56 kg/m  as calculated from the following:    Height as of this encounter: 1.575 m (5' 2\").    Weight as of this encounter: 80.7 kg (178 lb).    Weight management plan: Discussed healthy diet and exercise guidelines    She reports that she has never smoked. She has never used smokeless tobacco.      Counseling Resources:  ATP IV Guidelines  Pooled Cohorts Equation Calculator  Breast Cancer Risk Calculator  BRCA-Related Cancer Risk Assessment: FHS-7 Tool  FRAX Risk Assessment  ICSI Preventive Guidelines  Dietary Guidelines for Americans, 2010  USDA's MyPlate  ASA Prophylaxis  Lung CA Screening    DEMOND Snowden  [facility]  "

## 2021-09-01 NOTE — NURSING NOTE
Chief Complaint   Patient presents with     Physical     fasting, discuss antidepressant medication     Pre-Visit Screening:  Immunizations: UTD  Colonoscopy: NA  Mammogram: NA  Asthma Action Test/Plan: NA  PHQ9: done today   GAD7:done today  Questioned patient about current smoking habits Pt. never  OK to leave a detailed message on voice mail for today's visit  YES, phone # [phone]

## 2021-09-05 NOTE — PROGRESS NOTES
Dennys Escudero is a 41 year old female who is being evaluated via a billable video visit (audio/video synchronous).     Video Start Time: 8:59 AM          Originating Location (pt. Location): Home    Distant Location (provider location): [facility] office      Subjective     Dennys Escudero is a 41 year old female who presents today for the following health issues:    HPI     Anxiety worse in the last year.  Irritability is primary concern    Lexapro in the past for depression/anxiety -   Trouble falling asleep with anxiety        PHQ-9 SCORE 2021   PHQ-9 Total Score MyChart - - 6 (Mild depression)   PHQ-9 Total Score 5 10 6         BRIANNE-7 SCORE 2021   Total Score - - 5 (mild anxiety)   Total Score 4 9 5           Patient Active Problem List   Diagnosis     Other acne     Attention deficit hyperactivity disorder (ADHD), predominantly inattentive type     Anxiety state     Health Care Home     Adjustment reaction     ACP (advance care planning)     Seasonal allergic rhinitis     Insomnia, unspecified type     Need for vaccination     Past Surgical History:   Procedure Laterality Date     ARTHROSCOPY OF JOINT UNLISTED Left     patellar tendon      SECTION      post partum hemorrage, hysterectomy     HYSTERECTOMY      w/o oophorectomy     ZZHC CREATE EARDRUM OPENING,GEN ANESTH      P.E. Tubes       Social History     Tobacco Use     Smoking status: Never Smoker     Smokeless tobacco: Never Used   Substance Use Topics     Alcohol use: Yes     Alcohol/week: 2.0 standard drinks     Types: 2 Standard drinks or equivalent per week     Family History   Problem Relation Age of Onset     Rheumatoid Arthritis Mother      Gastrointestinal Disease Father         ulcers     Chronic Obstructive Pulmonary Disease Maternal Grandmother      Parkinsonism Paternal Grandmother      Rheumatoid Arthritis Maternal Uncle      Heart Disease No family hx of       "Cancer No family hx of      Diabetes No family hx of            Current Outpatient Medications   Medication Sig Dispense Refill     cholecalciferol (VITAMIN D3) 83869 UNITS capsule Take 1 capsule (50,000 Units) by mouth once a week 12 capsule 0     lisdexamfetamine (VYVANSE) 30 MG capsule Take 1 capsule (30 mg) by mouth every morning 90 capsule 0     Allergies   Allergen Reactions     Estrogens      causing anxiety attack around menstrual time     Penicillin G      Recent Labs   Lab Test 09/01/21  0000 03/21/16  0850    74   HDL 59 52   TRIG 159* 120        BP Readings from Last 3 Encounters:   09/01/21 116/78   06/17/21 110/64   02/02/21 100/60    Wt Readings from Last 3 Encounters:   09/01/21 80.7 kg (178 lb)   06/17/21 82.4 kg (181 lb 9.6 oz)   02/02/21 82.9 kg (182 lb 12.8 oz)                                 Review of Systems   Constitutional, HEENT, cardiovascular, pulmonary, gi and gu systems are negative, except as otherwise noted.      Objective    LMP 07/01/2005   Estimated body mass index is 32.56 kg/m  as calculated from the following:    Height as of 9/1/21: 1.575 m (5' 2\").    Weight as of 9/1/21: 80.7 kg (178 lb).  Physical Exam       GENERAL: Healthy, alert and no distress  EYES: Eyes grossly normal to inspection.  No discharge or erythema, or obvious scleral/conjunctival abnormalities.  RESP: No audible wheeze, cough, or visible cyanosis.  No visible retractions or increased work of breathing.    SKIN: Visible skin clear. No significant rash, abnormal pigmentation or lesions.  NEURO: Cranial nerves grossly intact.  Mentation and speech appropriate for age.      Mental Status Exam:   Appearance: calm  Grooming: adequately groomed  Demeanor: engaged, cooperative  Affect: normal  Speech: Normal.  Gait:Normal.  Movements: Normal  Form of thought: Logical, Linear and Goal directed  Thought content:  Normal  Insight:Good   Judgment: Good   Cognition: Good       Assessment & Plan       ICD-10-CM  "   1. Anxiety state  F41.1 escitalopram (LEXAPRO) 10 MG tablet     Restart Lexapro  I reviewed the patient information handout from Up To Date on this medication including side effects with the patient.  Discussed Serotonin syndrome risk and signs/symptoms to watch for including confusion, worsening anxiety, muscle spasm, fever, rapid heart rate.          Follow-up 4-6 weeks via virtual visit  Call with any concerns        DEMOND Snowden  [facility]        Video-Visit Details    Type of service:  Video Visit (audio/video)    Video End Time (time video stopped): 9:10 AM    Mode of Communication:  Lolly

## 2021-09-07 ENCOUNTER — OFFICE VISIT (OUTPATIENT)
Dept: FAMILY MEDICINE | Facility: CLINIC | Age: 41
End: 2021-09-07

## 2021-09-07 DIAGNOSIS — F41.1 ANXIETY STATE: Primary | ICD-10-CM

## 2021-09-07 PROCEDURE — 99213 OFFICE O/P EST LOW 20 MIN: CPT | Mod: GT | Performed by: PHYSICIAN ASSISTANT

## 2021-09-07 RX ORDER — ESCITALOPRAM OXALATE 10 MG/1
TABLET ORAL
Qty: 90 TABLET | Refills: 0 | Status: SHIPPED | OUTPATIENT
Start: 2021-09-07 | End: 2021-12-08

## 2021-09-07 NOTE — NURSING NOTE
Dennys wants to discuss starting medication for anxiety and depression, states been going on for a while.    Cell # confirmed   [facility]

## 2021-10-23 ENCOUNTER — HEALTH MAINTENANCE LETTER (OUTPATIENT)
Age: 41
End: 2021-10-23

## 2021-10-28 DIAGNOSIS — F41.1 ANXIETY STATE: ICD-10-CM

## 2021-10-29 RX ORDER — ESCITALOPRAM OXALATE 10 MG/1
TABLET ORAL
Qty: 90 TABLET | Refills: 0 | COMMUNITY
Start: 2021-10-29

## 2021-10-29 NOTE — TELEPHONE ENCOUNTER
Dennys Escudero is requesting a refill of:    Refused Prescriptions:                       Disp   Refills    escitalopram (LEXAPRO) 10 MG tablet [Pharm*90 tab*0        Sig: TAKE 1/2 TABLET BY MOUTH FOR 1-2 WEEKS THEN ONE           TABLET DAILY  Refused By: MUNA COKER  Reason for Refusal: Patient needs appointment

## 2021-12-08 ENCOUNTER — OFFICE VISIT (OUTPATIENT)
Dept: FAMILY MEDICINE | Facility: CLINIC | Age: 41
End: 2021-12-08

## 2021-12-08 DIAGNOSIS — F41.1 ANXIETY STATE: ICD-10-CM

## 2021-12-08 DIAGNOSIS — F90.0 ATTENTION DEFICIT HYPERACTIVITY DISORDER (ADHD), PREDOMINANTLY INATTENTIVE TYPE: Primary | ICD-10-CM

## 2021-12-08 PROCEDURE — 99213 OFFICE O/P EST LOW 20 MIN: CPT | Mod: GT | Performed by: PHYSICIAN ASSISTANT

## 2021-12-08 RX ORDER — LISDEXAMFETAMINE DIMESYLATE 30 MG/1
30 CAPSULE ORAL DAILY
Qty: 30 CAPSULE | Refills: 0 | Status: SHIPPED | OUTPATIENT
Start: 2022-02-08 | End: 2022-03-10

## 2021-12-08 RX ORDER — ESCITALOPRAM OXALATE 10 MG/1
10 TABLET ORAL DAILY
Qty: 90 TABLET | Refills: 1 | Status: SHIPPED | OUTPATIENT
Start: 2021-12-08 | End: 2022-04-28

## 2021-12-08 RX ORDER — LISDEXAMFETAMINE DIMESYLATE 30 MG/1
30 CAPSULE ORAL DAILY
Qty: 30 CAPSULE | Refills: 0 | Status: SHIPPED | OUTPATIENT
Start: 2021-12-08 | End: 2022-01-07

## 2021-12-08 RX ORDER — LISDEXAMFETAMINE DIMESYLATE 30 MG/1
30 CAPSULE ORAL DAILY
Qty: 30 CAPSULE | Refills: 0 | Status: SHIPPED | OUTPATIENT
Start: 2022-01-08 | End: 2022-02-07

## 2021-12-08 NOTE — NURSING NOTE
Chief Complaint   Patient presents with     Recheck Medication     non fasting virtual medication check, needs refills of vyvanse and escitalopram     Called patient and reviewed her chart over the phone to make sure that we have everything up to date. [employer] message was sent with the questionnaires to fill out.

## 2021-12-08 NOTE — PROGRESS NOTES
Dennys Escudero is a 41 year old female who is being evaluated via a billable video visit (audio/video synchronous).             Originating Location (pt. Location): Home    Distant Location (provider location): [facility] office      Subjective     Dennys Escudero is a 41 year old female who presents today for the following health issues:    HPI     Anxiety Follow-Up - started on Lexapro    How are you doing with your anxiety since your last visit? Improved - irritability has improved    Are you having other symptoms that might be associated with anxiety? No    Have you had a significant life event? No     Are you feeling depressed? No    Do you have any concerns with your use of alcohol or other drugs? No     Doing well on Vyvanse - would like refills    PHQ-9 SCORE 6/17/2021 9/7/2021 12/8/2021   PHQ-9 Total Score MyChart - 6 (Mild depression) 3 (Minimal depression)   PHQ-9 Total Score 10 6 3         BRIANNE-7 SCORE 6/17/2021 9/7/2021 12/8/2021   Total Score - 5 (mild anxiety) 2 (minimal anxiety)   Total Score 9 5 2           Social History     Tobacco Use     Smoking status: Never Smoker     Smokeless tobacco: Never Used   Substance Use Topics     Alcohol use: Yes     Alcohol/week: 2.0 standard drinks     Types: 2 Standard drinks or equivalent per week     Drug use: No     BRIANEN-7 SCORE 6/17/2021 9/7/2021 12/8/2021   Total Score - 5 (mild anxiety) 2 (minimal anxiety)   Total Score 9 5 2     PHQ 6/17/2021 9/7/2021 12/8/2021   PHQ-9 Total Score 10 6 3   Q9: Thoughts of better off dead/self-harm past 2 weeks Not at all Not at all Not at all         Patient Active Problem List   Diagnosis     Other acne     Attention deficit hyperactivity disorder (ADHD), predominantly inattentive type     Anxiety state     Health Care Home     Adjustment reaction     ACP (advance care planning)     Seasonal allergic rhinitis     Insomnia, unspecified type     Need for vaccination     Past Surgical History:   Procedure  Laterality Date     ARTHROSCOPY OF JOINT UNLISTED Left     patellar tendon      SECTION      post partum hemorrage, hysterectomy     HYSTERECTOMY      w/o oophorectomy     ZZHC CREATE EARDRUM OPENING,GEN ANESTH      P.E. Tubes       Social History     Tobacco Use     Smoking status: Never Smoker     Smokeless tobacco: Never Used   Substance Use Topics     Alcohol use: Yes     Alcohol/week: 2.0 standard drinks     Types: 2 Standard drinks or equivalent per week     Family History   Problem Relation Age of Onset     Rheumatoid Arthritis Mother      Gastrointestinal Disease Father         ulcers     Chronic Obstructive Pulmonary Disease Maternal Grandmother      Parkinsonism Paternal Grandmother      Rheumatoid Arthritis Maternal Uncle      Heart Disease No family hx of      Cancer No family hx of      Diabetes No family hx of            Current Outpatient Medications   Medication Sig Dispense Refill     cholecalciferol (VITAMIN D3) 50661 UNITS capsule Take 1 capsule (50,000 Units) by mouth once a week 12 capsule 0     escitalopram (LEXAPRO) 10 MG tablet Take 1 tablet (10 mg) by mouth daily 90 tablet 1     lisdexamfetamine (VYVANSE) 30 MG capsule Take 1 capsule (30 mg) by mouth daily 30 capsule 0     [START ON 2022] lisdexamfetamine (VYVANSE) 30 MG capsule Take 1 capsule (30 mg) by mouth daily 30 capsule 0     [START ON 2022] lisdexamfetamine (VYVANSE) 30 MG capsule Take 1 capsule (30 mg) by mouth daily 30 capsule 0     lisdexamfetamine (VYVANSE) 30 MG capsule Take 1 capsule (30 mg) by mouth every morning 90 capsule 0     Allergies   Allergen Reactions     Estrogens      causing anxiety attack around menstrual time     Penicillin G      Recent Labs   Lab Test 21  0000 16  0850    74   HDL 59 52   TRIG 159* 120        BP Readings from Last 3 Encounters:   21 116/78   21 110/64   21 100/60    Wt Readings from Last 3 Encounters:   21 80.7 kg (178 lb)  "  06/17/21 82.4 kg (181 lb 9.6 oz)   02/02/21 82.9 kg (182 lb 12.8 oz)                                Review of Systems   Constitutional, HEENT, cardiovascular, pulmonary, gi and gu systems are negative, except as otherwise noted.      Objective    LMP 07/01/2005   Estimated body mass index is 32.56 kg/m  as calculated from the following:    Height as of 9/1/21: 1.575 m (5' 2\").    Weight as of 9/1/21: 80.7 kg (178 lb).  Physical Exam       GENERAL: Healthy, alert and no distress  EYES: Eyes grossly normal to inspection.  No discharge or erythema, or obvious scleral/conjunctival abnormalities.  RESP: No audible wheeze, cough, or visible cyanosis.  No visible retractions or increased work of breathing.    SKIN: Visible skin clear. No significant rash, abnormal pigmentation or lesions.  NEURO: Cranial nerves grossly intact.  Mentation and speech appropriate for age.      Mental Status Exam:   Appearance: calm  Grooming: adequately groomed  Demeanor: engaged, cooperative  Affect: normal  Speech: Normal.  Gait:Normal.  Movements: Normal  Form of thought: Logical, Linear and Goal directed  Thought content:  Normal  Insight:Good   Judgment: Good   Cognition: Good         Diagnostic Test Results:  Labs reviewed in Epic          Assessment & Plan     Dennys was seen today for recheck medication.    Diagnoses and all orders for this visit:    Attention deficit hyperactivity disorder (ADHD), predominantly inattentive type  -     lisdexamfetamine (VYVANSE) 30 MG capsule; Take 1 capsule (30 mg) by mouth daily  -     lisdexamfetamine (VYVANSE) 30 MG capsule; Take 1 capsule (30 mg) by mouth daily  -     lisdexamfetamine (VYVANSE) 30 MG capsule; Take 1 capsule (30 mg) by mouth daily    Anxiety state  -     escitalopram (LEXAPRO) 10 MG tablet; Take 1 tablet (10 mg) by mouth daily             Follow-up in clinic 6 months  OK for refills until then        DEMOND Snowden  [facility]        Video-Visit " Details    Type of service:  Video Visit (audio/video)    Mode of Communication:  Doximity

## 2022-04-28 ENCOUNTER — OFFICE VISIT (OUTPATIENT)
Dept: FAMILY MEDICINE | Facility: CLINIC | Age: 42
End: 2022-04-28

## 2022-04-28 VITALS
TEMPERATURE: 98 F | WEIGHT: 180 LBS | HEART RATE: 76 BPM | DIASTOLIC BLOOD PRESSURE: 74 MMHG | RESPIRATION RATE: 22 BRPM | OXYGEN SATURATION: 98 % | SYSTOLIC BLOOD PRESSURE: 116 MMHG | BODY MASS INDEX: 32.92 KG/M2

## 2022-04-28 DIAGNOSIS — F41.1 ANXIETY STATE: ICD-10-CM

## 2022-04-28 DIAGNOSIS — F98.8 ATTENTION DEFICIT DISORDER (ADD) WITHOUT HYPERACTIVITY: ICD-10-CM

## 2022-04-28 DIAGNOSIS — E55.9 VITAMIN D DEFICIENCY: ICD-10-CM

## 2022-04-28 DIAGNOSIS — Z13.820 SCREENING FOR OSTEOPOROSIS: ICD-10-CM

## 2022-04-28 DIAGNOSIS — F90.0 ATTENTION DEFICIT HYPERACTIVITY DISORDER (ADHD), PREDOMINANTLY INATTENTIVE TYPE: Primary | ICD-10-CM

## 2022-04-28 PROCEDURE — 99213 OFFICE O/P EST LOW 20 MIN: CPT | Performed by: PHYSICIAN ASSISTANT

## 2022-04-28 PROCEDURE — 82306 VITAMIN D 25 HYDROXY: CPT | Mod: 90 | Performed by: PHYSICIAN ASSISTANT

## 2022-04-28 RX ORDER — LISDEXAMFETAMINE DIMESYLATE 30 MG/1
30 CAPSULE ORAL DAILY
Qty: 30 CAPSULE | Refills: 0 | Status: SHIPPED | OUTPATIENT
Start: 2022-06-29 | End: 2022-07-29

## 2022-04-28 RX ORDER — LISDEXAMFETAMINE DIMESYLATE 30 MG/1
30 CAPSULE ORAL DAILY
Qty: 30 CAPSULE | Refills: 0 | Status: SHIPPED | OUTPATIENT
Start: 2022-05-29 | End: 2022-06-28

## 2022-04-28 RX ORDER — ESCITALOPRAM OXALATE 10 MG/1
10 TABLET ORAL DAILY
Qty: 90 TABLET | Refills: 3 | Status: SHIPPED | OUTPATIENT
Start: 2022-04-28 | End: 2022-11-01

## 2022-04-28 RX ORDER — LISDEXAMFETAMINE DIMESYLATE 30 MG/1
30 CAPSULE ORAL DAILY
Qty: 30 CAPSULE | Refills: 0 | Status: SHIPPED | OUTPATIENT
Start: 2022-04-28 | End: 2022-05-28

## 2022-04-28 ASSESSMENT — ANXIETY QUESTIONNAIRES
IF YOU CHECKED OFF ANY PROBLEMS ON THIS QUESTIONNAIRE, HOW DIFFICULT HAVE THESE PROBLEMS MADE IT FOR YOU TO DO YOUR WORK, TAKE CARE OF THINGS AT HOME, OR GET ALONG WITH OTHER PEOPLE: NOT DIFFICULT AT ALL
5. BEING SO RESTLESS THAT IT IS HARD TO SIT STILL: NOT AT ALL
6. BECOMING EASILY ANNOYED OR IRRITABLE: SEVERAL DAYS
GAD7 TOTAL SCORE: 2
7. FEELING AFRAID AS IF SOMETHING AWFUL MIGHT HAPPEN: NOT AT ALL
1. FEELING NERVOUS, ANXIOUS, OR ON EDGE: NOT AT ALL
3. WORRYING TOO MUCH ABOUT DIFFERENT THINGS: NOT AT ALL
2. NOT BEING ABLE TO STOP OR CONTROL WORRYING: NOT AT ALL

## 2022-04-28 ASSESSMENT — PATIENT HEALTH QUESTIONNAIRE - PHQ9
SUM OF ALL RESPONSES TO PHQ QUESTIONS 1-9: 4
5. POOR APPETITE OR OVEREATING: SEVERAL DAYS

## 2022-04-28 NOTE — PROGRESS NOTES
ADD-ADHD Follow Up    SUBJECTIVE:  Dennys Escudero is a 42 year old  female who is being treated for Attention Deficit Disorder.    Are your symptoms controlled?   YES  Are you satisfied with your current medication dosage? YES  Are you taking your medication regularly?YES  Are you experiencing any insomnia? Mild  Are you experiencing any jitteriness? NO  Are you experiencing any loss of appetite or weight loss? No  Are you experiencing any other side effects? No    Wt Readings from Last 5 Encounters:   04/28/22 81.6 kg (180 lb)   09/01/21 80.7 kg (178 lb)   06/17/21 82.4 kg (181 lb 9.6 oz)   02/02/21 82.9 kg (182 lb 12.8 oz)   07/13/20 79.8 kg (176 lb)       Other concerns:     Anxiety - Lexapro  Irritability has improved.     Vit D def in the past  No recent taking b/c not eating till 4:30 and was told not to take on empty stomach    Father is Upper sorbian - wondering about getting DEXA due to family hx osteoporosis.       ROS:    E/M: NEGATIVE for ear, mouth and throat problems  R: NEGATIVE for significant cough or SOB  CV: NEGATIVE for chest pain or palpitations   GI: NEGATIVE for nausea, vomiting, abdominal pain, diarrhea or constipation         No past medical history on file.  Family History   Problem Relation Age of Onset     Rheumatoid Arthritis Mother      Gastrointestinal Disease Father         ulcers     Chronic Obstructive Pulmonary Disease Maternal Grandmother      Hyperlipidemia Maternal Grandfather      Coronary Artery Disease Maternal Grandfather      Parkinsonism Paternal Grandmother      Rheumatoid Arthritis Maternal Uncle      Heart Disease No family hx of      Cancer No family hx of      Diabetes No family hx of      Social History     Socioeconomic History     Marital status:      Spouse name: Not on file     Number of children: Not on file     Years of education: Not on file     Highest education level: Not on file   Occupational History     Not on file   Tobacco Use     Smoking status: Never  Smoker     Smokeless tobacco: Never Used   Substance and Sexual Activity     Alcohol use: Yes     Alcohol/week: 2.0 standard drinks     Types: 2 Standard drinks or equivalent per week     Drug use: No     Sexual activity: Yes     Partners: Male   Other Topics Concern     Not on file   Social History Narrative     Not on file     Social Determinants of Health     Financial Resource Strain: Not on file   Food Insecurity: Not on file   Transportation Needs: Not on file   Physical Activity: Not on file   Stress: Not on file   Social Connections: Not on file   Intimate Partner Violence: Not on file   Housing Stability: Not on file     Patient Active Problem List   Diagnosis     Other acne     Attention deficit hyperactivity disorder (ADHD), predominantly inattentive type     Anxiety state     Health Care Home     Adjustment reaction     ACP (advance care planning)     Seasonal allergic rhinitis     Insomnia, unspecified type     Need for vaccination     Current Outpatient Medications   Medication     cholecalciferol (VITAMIN D3) 95400 UNITS capsule     escitalopram (LEXAPRO) 10 MG tablet     lisdexamfetamine (VYVANSE) 30 MG capsule     [START ON 5/29/2022] lisdexamfetamine (VYVANSE) 30 MG capsule     [START ON 6/29/2022] lisdexamfetamine (VYVANSE) 30 MG capsule     lisdexamfetamine (VYVANSE) 30 MG capsule     No current facility-administered medications for this visit.        Allergies:  Allergies   Allergen Reactions     Estrogens      causing anxiety attack around menstrual time     Penicillin G        OBJECTIVE  Vitals:    04/28/22 0915   BP: 116/74   BP Location: Left arm   Patient Position: Sitting   Cuff Size: Adult Large   Pulse: 76   Resp: 22   Temp: 98  F (36.7  C)   TempSrc: Temporal   SpO2: 98%   Weight: 81.6 kg (180 lb)        PHYSICAL EXAMINATION    Patient is a 42 year old female, in no acute distress. Vitals noted   Head: Normocephalic, atraumatic.  Cardiac:  Normal rhythm and rate, no murmurs, rubs or  gallops.  Lungs: clear to auscultation bilaterally; no wheezes, crackles or rhonchi      ASSESSMENT/PLAN:      Dennys was seen today for recheck medication.    Diagnoses and all orders for this visit:    Attention deficit hyperactivity disorder (ADHD), predominantly inattentive type  -     lisdexamfetamine (VYVANSE) 30 MG capsule; Take 1 capsule (30 mg) by mouth daily  -     lisdexamfetamine (VYVANSE) 30 MG capsule; Take 1 capsule (30 mg) by mouth daily  -     lisdexamfetamine (VYVANSE) 30 MG capsule; Take 1 capsule (30 mg) by mouth daily  Controlled.   Continue current medication(s) at current dose(s).    Anxiety state  -     escitalopram (LEXAPRO) 10 MG tablet; Take 1 tablet (10 mg) by mouth daily  Controlled.   Continue current medication(s) at current dose(s).    Screening for osteoporosis  -     Dexa hip/pelvis/spine  -     Radiology Referral; Future  Pt will check ins. May pay OOP    Vitamin D deficiency  -     VITAMIN D DEFICIENCY SCREENING ([facility])  Sent 2000 international unit(s) Vit D3 daily indefinitely          ADHD- control is good  Continue current medication dosing.    Follow up by My Chart in 3 month(s), patient agrees to come in to office every 6 months. Patient will come in immediately if any new concerns.        Taylor Hall PA-C  4/28/2022

## 2022-04-28 NOTE — NURSING NOTE
Chief Complaint   Patient presents with     Recheck Medication     Non fasting medication check and review       Pre-visit Screening:  Immunizations:  up to date  Colonoscopy:  NA  Mammogram: NA  Asthma Action Test/Plan:  NA  PHQ9:  Given today   GAD7:  Given today  Questioned patient about current smoking habits Pt. has never smoked.  Ok to leave detailed message on voice mail for today's visit only Yes, phone # [phone]

## 2022-04-29 LAB — VITAMIN D, 25-OH, TOTAL - QUEST: 32 NG/ML (ref 30–100)

## 2022-04-29 RX ORDER — CHOLECALCIFEROL (VITAMIN D3) 50 MCG
1 TABLET ORAL DAILY
Qty: 90 TABLET | Refills: 9 | Status: SHIPPED | OUTPATIENT
Start: 2022-04-29 | End: 2023-06-29

## 2022-04-29 ASSESSMENT — ANXIETY QUESTIONNAIRES: GAD7 TOTAL SCORE: 2

## 2022-06-29 ENCOUNTER — MYC MEDICAL ADVICE (OUTPATIENT)
Dept: FAMILY MEDICINE | Facility: CLINIC | Age: 42
End: 2022-06-29

## 2022-07-22 ENCOUNTER — OFFICE VISIT (OUTPATIENT)
Dept: FAMILY MEDICINE | Facility: CLINIC | Age: 42
End: 2022-07-22

## 2022-07-22 VITALS
TEMPERATURE: 98 F | BODY MASS INDEX: 33.68 KG/M2 | HEIGHT: 62 IN | DIASTOLIC BLOOD PRESSURE: 72 MMHG | HEART RATE: 88 BPM | SYSTOLIC BLOOD PRESSURE: 108 MMHG | OXYGEN SATURATION: 98 % | WEIGHT: 183 LBS

## 2022-07-22 DIAGNOSIS — L50.9 DIFFUSE URTICARIA: Primary | ICD-10-CM

## 2022-07-22 PROCEDURE — 99213 OFFICE O/P EST LOW 20 MIN: CPT | Performed by: PHYSICIAN ASSISTANT

## 2022-07-22 RX ORDER — FAMOTIDINE 20 MG/1
20 TABLET, FILM COATED ORAL 2 TIMES DAILY
Qty: 60 TABLET | Refills: 0 | Status: SHIPPED | OUTPATIENT
Start: 2022-07-22 | End: 2022-11-01

## 2022-07-22 RX ORDER — PREDNISONE 10 MG/1
TABLET ORAL
Qty: 25 TABLET | Refills: 0 | Status: SHIPPED | OUTPATIENT
Start: 2022-07-22 | End: 2022-11-01

## 2022-07-22 NOTE — PROGRESS NOTES
"CC: Rash/hives    History:  Dennys is here today with itching, burning, rash all over her body. This has been ongoing intermittently for the past 5 weeks. Has rotated through several locations, legs, low back, feet, neck, arms, hands, face. Not mouth/throat/breathing involvement. Has been taking Zrytec in morning and Benadryl later in the day. Now in the past 2 days, itching has been so severe that she is taking baths just to feel temporary relief. Unable to do her normal life responsibilities.     She has tried to think of anything that may have triggered this with food/diet, laundry, shower, clothing, exposures, but can't think of anything that changed. Does spend time gardening, but this hasn't change. Low back flare seemed to be triggered by heat. She shows me a picture of this, where it appears bright red, annual in formation. Resolved today.    Otherwise, feeling well- no fever, sweats, chills, SOB, sore throat.     No history of anything similar, but has had heat rash at times.     PMH, MEDICATIONS, ALLERGIES, SOCIAL AND FAMILY HISTORY in [facility] and reviewed by me personally.    ROS negative other than the symptoms noted above in the HPI.    Examination   /72 (BP Location: Left arm, Patient Position: Sitting, Cuff Size: Adult Large)   Pulse 88   Temp 98  F (36.7  C) (Temporal)   Ht 1.575 m (5' 2\")   Wt 83 kg (183 lb)   LMP 07/01/2005   SpO2 98%   BMI 33.47 kg/m       Constitutional: Sitting comfortably, in no acute distress. Vital signs noted  Neck:  no adenopathy, trachea midline and normal to palpation, thyroid normal to palpation  Cardiovascular:  regular rate and rhythm, no murmurs, clicks, or gallops  Respiratory:  normal respiratory rate and rhythm, lungs clear to auscultation  SKIN: No jaundice/pallor. Small mildly erythematous urticaria 0.5-1.5 cm in diameter with surrounding pallor of skin on lower extremities, currently sparing feet.   Psychiatric: mentation appears normal and affect " normal/bright    A/P    ICD-10-CM    1. Diffuse urticaria  L50.9 predniSONE (DELTASONE) 10 MG tablet     famotidine (PEPCID) 20 MG tablet     Adult Dermatology Referral       DISCUSSION:  Diffuse urticaria:  Consistent with urticaria. Unsure of what she may be reacting to- explained to pt that we often never determine cause. However, continue to think of anything that may have changed recently.     Recommended continue Zyrtec, Benadryl, but start 10 day tapering course of prednisone. Warned of side effects like drowsiness, jitteriness, nausea, diarrhea, constipation, weight changes, irritability, mood swings, and to contact me if noted. Can also take famotidine 20 mg twice daily as this may be beneficial as well.     Will order dermatology referral for her to schedule in case not resolving. Can always cancel if resolved.      follow up visit: As needed    Dena Carson PA-C  [facility]

## 2022-08-17 NOTE — TELEPHONE ENCOUNTER
Denied refill for famotidine. Pt was to use for short term for urticaria.   If this medication did not help, she can make an appt.

## 2022-10-09 ENCOUNTER — HEALTH MAINTENANCE LETTER (OUTPATIENT)
Age: 42
End: 2022-10-09

## 2022-10-31 NOTE — PROGRESS NOTES
ADD-ADHD Follow Up    SUBJECTIVE:  Dennys Escudero is a 42 year old  female who is being treated for Attention Deficit Disorder.    Are your symptoms controlled?   YES  Are you satisfied with your current medication dosage? YES  Are you taking your medication regularly?YES  Are you experiencing any insomnia? No  Are you experiencing any jitteriness? No  Are you experiencing any loss of appetite or weight loss? No  Are you experiencing any other side effects? No    Wt Readings from Last 5 Encounters:   11/01/22 85.7 kg (189 lb)   07/22/22 83 kg (183 lb)   04/28/22 81.6 kg (180 lb)   09/01/21 80.7 kg (178 lb)   06/17/21 82.4 kg (181 lb 9.6 oz)       Other concerns: urticaria started in July - back/daily itching  Hives  Zyrtec    Allergic to Mold   Spring/fall     Mammogram in 20s due to lump      ROS:    E/M: NEGATIVE for ear, mouth and throat problems  R: NEGATIVE for significant cough or SOB  CV: NEGATIVE for chest pain or palpitations   GI: NEGATIVE for nausea, vomiting, abdominal pain, diarrhea or constipation         No past medical history on file.  Family History   Problem Relation Age of Onset     Rheumatoid Arthritis Mother      Gastrointestinal Disease Father         ulcers     Chronic Obstructive Pulmonary Disease Maternal Grandmother      Hyperlipidemia Maternal Grandfather      Coronary Artery Disease Maternal Grandfather      Parkinsonism Paternal Grandmother      Rheumatoid Arthritis Maternal Uncle      Heart Disease No family hx of      Cancer No family hx of      Diabetes No family hx of      Social History     Socioeconomic History     Marital status:      Spouse name: Not on file     Number of children: Not on file     Years of education: Not on file     Highest education level: Not on file   Occupational History     Not on file   Tobacco Use     Smoking status: Never     Smokeless tobacco: Never   Substance and Sexual Activity     Alcohol use: Yes     Alcohol/week: 2.0 standard drinks      "Types: 2 Standard drinks or equivalent per week     Drug use: No     Sexual activity: Yes     Partners: Male   Other Topics Concern     Not on file   Social History Narrative     Not on file     Social Determinants of Health     Financial Resource Strain: Not on file   Food Insecurity: Not on file   Transportation Needs: Not on file   Physical Activity: Not on file   Stress: Not on file   Social Connections: Not on file   Intimate Partner Violence: Not on file   Housing Stability: Not on file     Patient Active Problem List   Diagnosis     Other acne     Attention deficit hyperactivity disorder (ADHD), predominantly inattentive type     Anxiety state     Health Care Home     Adjustment reaction     ACP (advance care planning)     Seasonal allergic rhinitis     Insomnia, unspecified type     Need for vaccination     Current Outpatient Medications   Medication     escitalopram (LEXAPRO) 10 MG tablet     lisdexamfetamine (VYVANSE) 30 MG capsule     [START ON 12/2/2022] lisdexamfetamine (VYVANSE) 30 MG capsule     [START ON 1/2/2023] lisdexamfetamine (VYVANSE) 30 MG capsule     vitamin D3 (CHOLECALCIFEROL) 50 mcg (2000 units) tablet     VYVANSE 30 MG capsule     No current facility-administered medications for this visit.        Allergies:  Allergies   Allergen Reactions     Estrogens      causing anxiety attack around menstrual time     Penicillin G        OBJECTIVE  Vitals:    11/01/22 1218   BP: 112/70   BP Location: Right arm   Patient Position: Sitting   Cuff Size: Adult Large   Pulse: 85   Temp: 98  F (36.7  C)   TempSrc: Temporal   SpO2: 98%   Weight: 85.7 kg (189 lb)   Height: 1.575 m (5' 2\")        PHYSICAL EXAMINATION    Patient is a 42 year old female, in no acute distress. Vitals noted   Cardiac:  Normal rhythm and rate, no murmurs, rubs or gallops.  Lungs: clear to auscultation bilaterally; no wheezes, crackles or rhonchi    Mental Status Exam:   Appearance: calm  Grooming: adequately groomed  Demeanor: " engaged, cooperative  Affect: normal  Speech: Normal.  Gait:Normal.  Movements: Normal  Form of thought: Logical, Linear and Goal directed  Thought content:  Normal  Insight:Good   Judgment: Good   Cognition: Good         ASSESSMENT/PLAN:      Dennys was seen today for recheck medication.    Diagnoses and all orders for this visit:    Attention deficit hyperactivity disorder (ADHD), predominantly inattentive type  -     lisdexamfetamine (VYVANSE) 30 MG capsule; Take 1 capsule (30 mg) by mouth daily for 30 days  -     lisdexamfetamine (VYVANSE) 30 MG capsule; Take 1 capsule (30 mg) by mouth daily for 30 days  -     lisdexamfetamine (VYVANSE) 30 MG capsule; Take 1 capsule (30 mg) by mouth daily for 30 days    Anxiety state  -     escitalopram (LEXAPRO) 10 MG tablet; Take 1 tablet (10 mg) by mouth daily    Encounter for screening mammogram for malignant neoplasm of breast  -     Mammo Screening digital (bilat)  -     Radiology Referral    Diffuse urticaria  Continue Zyrtec daily    Other orders  -     FLU VAC PRESRV FREE QUAD SPLIT VIR 3+YRS IM        ADHD- control is good  Continue current medication dosing.    Follow up by My Chart in 3 month(s), patient agrees to come in to office every 6 months. Patient will come in immediately if any new concerns.    Pt will est care with Dena Hall PA-C  10/31/2022

## 2022-11-01 ENCOUNTER — OFFICE VISIT (OUTPATIENT)
Dept: FAMILY MEDICINE | Facility: CLINIC | Age: 42
End: 2022-11-01

## 2022-11-01 VITALS
SYSTOLIC BLOOD PRESSURE: 112 MMHG | TEMPERATURE: 98 F | HEART RATE: 85 BPM | BODY MASS INDEX: 34.78 KG/M2 | DIASTOLIC BLOOD PRESSURE: 70 MMHG | WEIGHT: 189 LBS | HEIGHT: 62 IN | OXYGEN SATURATION: 98 %

## 2022-11-01 DIAGNOSIS — Z12.31 ENCOUNTER FOR SCREENING MAMMOGRAM FOR BREAST CANCER: ICD-10-CM

## 2022-11-01 DIAGNOSIS — Z12.31 ENCOUNTER FOR SCREENING MAMMOGRAM FOR MALIGNANT NEOPLASM OF BREAST: ICD-10-CM

## 2022-11-01 DIAGNOSIS — F41.1 ANXIETY STATE: ICD-10-CM

## 2022-11-01 DIAGNOSIS — L50.9 DIFFUSE URTICARIA: ICD-10-CM

## 2022-11-01 DIAGNOSIS — F90.0 ATTENTION DEFICIT HYPERACTIVITY DISORDER (ADHD), PREDOMINANTLY INATTENTIVE TYPE: Primary | ICD-10-CM

## 2022-11-01 PROCEDURE — 90471 IMMUNIZATION ADMIN: CPT | Performed by: PHYSICIAN ASSISTANT

## 2022-11-01 PROCEDURE — 99213 OFFICE O/P EST LOW 20 MIN: CPT | Mod: 25 | Performed by: PHYSICIAN ASSISTANT

## 2022-11-01 PROCEDURE — 90686 IIV4 VACC NO PRSV 0.5 ML IM: CPT | Performed by: PHYSICIAN ASSISTANT

## 2022-11-01 RX ORDER — LISDEXAMFETAMINE DIMESYLATE 30 MG/1
30 CAPSULE ORAL DAILY
Qty: 30 CAPSULE | Refills: 0 | Status: SHIPPED | OUTPATIENT
Start: 2022-11-01 | End: 2022-12-01

## 2022-11-01 RX ORDER — ESCITALOPRAM OXALATE 10 MG/1
10 TABLET ORAL DAILY
Qty: 90 TABLET | Refills: 3 | Status: SHIPPED | OUTPATIENT
Start: 2022-11-01 | End: 2023-11-15

## 2022-11-01 RX ORDER — LISDEXAMFETAMINE DIMESYLATE 30 MG/1
30 CAPSULE ORAL DAILY
Qty: 30 CAPSULE | Refills: 0 | Status: SHIPPED | OUTPATIENT
Start: 2023-01-02 | End: 2023-02-01

## 2022-11-01 RX ORDER — LISDEXAMFETAMINE DIMESYLATE 30 MG/1
30 CAPSULE ORAL EVERY MORNING
Qty: 90 CAPSULE | Refills: 1 | Status: CANCELLED | OUTPATIENT
Start: 2022-11-01

## 2022-11-01 RX ORDER — LISDEXAMFETAMINE DIMESYLATE 30 MG/1
30 CAPSULE ORAL DAILY
Qty: 30 CAPSULE | Refills: 0 | Status: SHIPPED | OUTPATIENT
Start: 2022-12-02 | End: 2023-01-01

## 2022-11-01 RX ORDER — LISDEXAMFETAMINE DIMESYLATE 30 MG/1
CAPSULE ORAL
COMMUNITY
Start: 2022-09-08 | End: 2023-03-03

## 2022-11-01 ASSESSMENT — ANXIETY QUESTIONNAIRES
5. BEING SO RESTLESS THAT IT IS HARD TO SIT STILL: NOT AT ALL
2. NOT BEING ABLE TO STOP OR CONTROL WORRYING: NOT AT ALL
IF YOU CHECKED OFF ANY PROBLEMS ON THIS QUESTIONNAIRE, HOW DIFFICULT HAVE THESE PROBLEMS MADE IT FOR YOU TO DO YOUR WORK, TAKE CARE OF THINGS AT HOME, OR GET ALONG WITH OTHER PEOPLE: SOMEWHAT DIFFICULT
GAD7 TOTAL SCORE: 1
1. FEELING NERVOUS, ANXIOUS, OR ON EDGE: NOT AT ALL
3. WORRYING TOO MUCH ABOUT DIFFERENT THINGS: NOT AT ALL
7. FEELING AFRAID AS IF SOMETHING AWFUL MIGHT HAPPEN: NOT AT ALL
GAD7 TOTAL SCORE: 1
6. BECOMING EASILY ANNOYED OR IRRITABLE: SEVERAL DAYS

## 2022-11-01 ASSESSMENT — PATIENT HEALTH QUESTIONNAIRE - PHQ9
SUM OF ALL RESPONSES TO PHQ QUESTIONS 1-9: 2
5. POOR APPETITE OR OVEREATING: NOT AT ALL

## 2022-11-01 NOTE — Clinical Note
Flu vaccine wasn't signed so I deleted it, if she did get it can you please addend encounter?  Thanks, Taylor Hall PA-C

## 2022-11-01 NOTE — NURSING NOTE
Chief Complaint   Patient presents with     Recheck Medication     Refill medications     Pre-visit Screening:  Immunizations:  up to date  Colonoscopy:  NA  Mammogram: is due and ordered today  Asthma Action Test/Plan:  NA  PHQ9:  Done today  GAD7:  Done today  Questioned patient about current smoking habits Pt. has never smoked.  Ok to leave detailed message on voice mail for today's visit only Yes, phone # [phone]

## 2023-03-03 DIAGNOSIS — F90.0 ATTENTION DEFICIT HYPERACTIVITY DISORDER (ADHD), PREDOMINANTLY INATTENTIVE TYPE: Primary | ICD-10-CM

## 2023-03-03 RX ORDER — LISDEXAMFETAMINE DIMESYLATE 30 MG/1
30 CAPSULE ORAL DAILY
Qty: 30 CAPSULE | Refills: 0 | Status: SHIPPED | OUTPATIENT
Start: 2023-05-04 | End: 2023-06-03

## 2023-03-03 RX ORDER — LISDEXAMFETAMINE DIMESYLATE 30 MG/1
30 CAPSULE ORAL DAILY
Qty: 30 CAPSULE | Refills: 0 | Status: SHIPPED | OUTPATIENT
Start: 2023-03-03 | End: 2023-04-02

## 2023-03-03 RX ORDER — LISDEXAMFETAMINE DIMESYLATE 30 MG/1
30 CAPSULE ORAL DAILY
Qty: 30 CAPSULE | Refills: 0 | Status: SHIPPED | OUTPATIENT
Start: 2023-04-03 | End: 2023-05-03

## 2023-03-03 NOTE — TELEPHONE ENCOUNTER
Approved, OV in 3 months for further refills  Sedrick Stewart PA-C  [facility]        Pt called asking for her next 3 months of vyvanse 30MG. Pt last saw [facility] on 11/01/22.    Dennys Escudero is requesting a refill of:    Pending Prescriptions:                       Disp   Refills    lisdexamfetamine (VYVANSE) 30 MG capsule  30 cap*0            Sig: Take 1 capsule (30 mg) by mouth daily for 30 days    lisdexamfetamine (VYVANSE) 30 MG capsule  30 cap*0            Sig: Take 1 capsule (30 mg) by mouth daily for 30 days    lisdexamfetamine (VYVANSE) 30 MG capsule  30 cap*0            Sig: Take 1 capsule (30 mg) by mouth daily for 30 days

## 2023-05-23 DIAGNOSIS — E55.9 VITAMIN D DEFICIENCY: ICD-10-CM

## 2023-05-24 RX ORDER — CHOLECALCIFEROL (VITAMIN D3) 50 MCG
TABLET ORAL
Qty: 90 TABLET | Refills: 9 | COMMUNITY
Start: 2023-05-24

## 2023-05-24 NOTE — TELEPHONE ENCOUNTER
Dennys Escudero is requesting a refill of:    Refused Prescriptions:                       Disp   Refills    VITAMIN D3 50 MCG (2000 UT) tablet [Pharma*90 tab*9        Sig: TAKE 1 TABLET BY MOUTH EVERY DAY  Refused By: BRIAN SMITH  Reason for Refusal: Patient needs appointment    Last med recheck was 11/01/22 advised to return in 6 months. Due for OV

## 2023-06-29 ENCOUNTER — OFFICE VISIT (OUTPATIENT)
Dept: FAMILY MEDICINE | Facility: CLINIC | Age: 43
End: 2023-06-29

## 2023-06-29 VITALS
BODY MASS INDEX: 33.68 KG/M2 | HEIGHT: 62 IN | OXYGEN SATURATION: 98 % | WEIGHT: 183 LBS | DIASTOLIC BLOOD PRESSURE: 74 MMHG | SYSTOLIC BLOOD PRESSURE: 114 MMHG | TEMPERATURE: 97.9 F | HEART RATE: 95 BPM

## 2023-06-29 DIAGNOSIS — E55.9 VITAMIN D DEFICIENCY: ICD-10-CM

## 2023-06-29 DIAGNOSIS — F90.0 ATTENTION DEFICIT HYPERACTIVITY DISORDER (ADHD), PREDOMINANTLY INATTENTIVE TYPE: ICD-10-CM

## 2023-06-29 DIAGNOSIS — Z13.1 SCREENING FOR DIABETES MELLITUS: ICD-10-CM

## 2023-06-29 DIAGNOSIS — Z01.419 ENCOUNTER FOR GYNECOLOGICAL EXAMINATION: Primary | ICD-10-CM

## 2023-06-29 DIAGNOSIS — Z12.31 ENCOUNTER FOR SCREENING MAMMOGRAM FOR BREAST CANCER: ICD-10-CM

## 2023-06-29 DIAGNOSIS — Z13.228 SCREENING FOR METABOLIC DISORDER: ICD-10-CM

## 2023-06-29 DIAGNOSIS — Z13.220 SCREENING FOR LIPID DISORDERS: ICD-10-CM

## 2023-06-29 DIAGNOSIS — R68.82 LOW LIBIDO: ICD-10-CM

## 2023-06-29 LAB — HBA1C MFR BLD: 5.2 % (ref 4–7)

## 2023-06-29 PROCEDURE — 99396 PREV VISIT EST AGE 40-64: CPT | Performed by: PHYSICIAN ASSISTANT

## 2023-06-29 PROCEDURE — 80048 BASIC METABOLIC PNL TOTAL CA: CPT | Performed by: PHYSICIAN ASSISTANT

## 2023-06-29 PROCEDURE — 36415 COLL VENOUS BLD VENIPUNCTURE: CPT | Performed by: PHYSICIAN ASSISTANT

## 2023-06-29 PROCEDURE — 83036 HEMOGLOBIN GLYCOSYLATED A1C: CPT | Performed by: PHYSICIAN ASSISTANT

## 2023-06-29 PROCEDURE — 80061 LIPID PANEL: CPT | Performed by: PHYSICIAN ASSISTANT

## 2023-06-29 PROCEDURE — 99213 OFFICE O/P EST LOW 20 MIN: CPT | Mod: 25 | Performed by: PHYSICIAN ASSISTANT

## 2023-06-29 RX ORDER — LISDEXAMFETAMINE DIMESYLATE 30 MG/1
30 CAPSULE ORAL EVERY MORNING
COMMUNITY
End: 2024-02-15

## 2023-06-29 RX ORDER — LISDEXAMFETAMINE DIMESYLATE 30 MG/1
30 CAPSULE ORAL DAILY
Qty: 30 CAPSULE | Refills: 0 | Status: SHIPPED | OUTPATIENT
Start: 2023-08-30 | End: 2023-09-29

## 2023-06-29 RX ORDER — CHOLECALCIFEROL (VITAMIN D3) 50 MCG
1 TABLET ORAL DAILY
Qty: 90 TABLET | Refills: 3 | Status: SHIPPED | OUTPATIENT
Start: 2023-06-29 | End: 2024-07-16

## 2023-06-29 RX ORDER — LISDEXAMFETAMINE DIMESYLATE 30 MG/1
30 CAPSULE ORAL DAILY
Qty: 30 CAPSULE | Refills: 0 | Status: SHIPPED | OUTPATIENT
Start: 2023-07-30 | End: 2023-08-29

## 2023-06-29 RX ORDER — LISDEXAMFETAMINE DIMESYLATE 30 MG/1
30 CAPSULE ORAL DAILY
Qty: 30 CAPSULE | Refills: 0 | Status: SHIPPED | OUTPATIENT
Start: 2023-06-29 | End: 2023-07-29

## 2023-06-29 NOTE — PROGRESS NOTES
Chief Complaint:  Physical Exam    SUBJECTIVE:   Dennys Escudero is a 43 year old female presents for routine health maintenance.    Current concerns:   General:  Has had hysterectomy. May be getting some menopause symptoms. No sex drive. Gets somewhat regular cramping and bloating. Not getting hot flashes.     ADHD:  See separate note for medication check.     Menses are absent    Patient's last menstrual period was 07/01/2005.     Was last Pap smear normal: Yes    Due for mammogram:  Yes    Body mass index is 33.47 kg/m .    Present exercise habits:  3-5 times/week  Present dietary habits:  eats regular meals and follows a balanced nutrition diet. Some intermittent fasting.    Calcium intake: 0-2 servings daily.   Vit D intake: is taking supplement    Is the patient a smoker? No  If yes, smoking cessation advised and counseling provided.     Cardiovascular risk factors: none    Over the past few weeks, have you felt down or depressed? Little interest or pleasure in doing things? See separate note.     If in a relationship are there any Domestic violence concern: No    Last dental appointment:  this year  Last optical appointment:  3 years ago    Was the patient born between [id number] and has not had Hep C testing?  Has not been tested, declines testing    I have reviewed the following histories: Past Medical History, Past Surgical History, Social History, Family History, Problem List, Medication List and Allergies    No past medical history on file.  Family History   Problem Relation Age of Onset     Rheumatoid Arthritis Mother      Gastrointestinal Disease Father         ulcers     Chronic Obstructive Pulmonary Disease Maternal Grandmother      Hyperlipidemia Maternal Grandfather      Coronary Artery Disease Maternal Grandfather      Parkinsonism Paternal Grandmother      Rheumatoid Arthritis Maternal Uncle      Heart Disease No family hx of      Cancer No family hx of      Diabetes No family hx of      Social  History     Socioeconomic History     Marital status:      Spouse name: Not on file     Number of children: Not on file     Years of education: Not on file     Highest education level: Not on file   Occupational History     Not on file   Tobacco Use     Smoking status: Never     Passive exposure: Never     Smokeless tobacco: Never   Substance and Sexual Activity     Alcohol use: Yes     Alcohol/week: 1.0 standard drink of alcohol     Types: 1 Standard drinks or equivalent per week     Drug use: No     Sexual activity: Yes     Partners: Male   Other Topics Concern     Not on file   Social History Narrative     Not on file     Social Determinants of Health     Financial Resource Strain: Not on file   Food Insecurity: Not on file   Transportation Needs: Not on file   Physical Activity: Not on file   Stress: Not on file   Social Connections: Not on file   Intimate Partner Violence: Not on file   Housing Stability: Not on file     Past Surgical History:   Procedure Laterality Date     ARTHROSCOPY OF JOINT UNLISTED Left     patellar tendon      SECTION      post partum hemorrage, hysterectomy     HYSTERECTOMY      w/o oophorectomy     ZZHC CREATE EARDRUM OPENING,GEN ANESTH      P.E. Tubes       ROS:  E/M: NEGATIVE for ear, nose, mouth and throat problems  R: NEGATIVE for significant/chronic cough or SOB  CV: NEGATIVE for chest pain or palpitations  GI: NEGATIVE for abdominal pain, chronic diarrhea or constipation  :  NEGATIVE for dysuria, hematuria or vaginal discharge. No sexual health concerns.       Current Outpatient Medications   Medication     escitalopram (LEXAPRO) 10 MG tablet     lisdexamfetamine (VYVANSE) 30 MG capsule     lisdexamfetamine (VYVANSE) 30 MG capsule     [START ON 2023] lisdexamfetamine (VYVANSE) 30 MG capsule     [START ON 2023] lisdexamfetamine (VYVANSE) 30 MG capsule     vitamin D3 (CHOLECALCIFEROL) 50 mcg (2000 units) tablet     No current  "facility-administered medications for this visit.       Patient Active Problem List    Diagnosis Date Noted     Need for vaccination 06/17/2021     Priority: Medium     Insomnia, unspecified type 02/02/2021     Priority: Medium     Seasonal allergic rhinitis 03/21/2016     Priority: Medium     ACP (advance care planning) 03/12/2014     Priority: Medium     Advance Care Planning 3/21/2016: ACP Review of Chart / Resources Provided:  Reviewed chart for advance care plan.  Dennys Escudero has no plan or code status on file. Discussed available resources and provided with information. Confirmed code status reflects current choices pending further ACP discussions.  Confirmed/documented legally designated decision makers.  [facility]     Priority: Medium     State Tier Level:  Tier 1  Status:  n/a  Care Coordinator:        See Letters for [facility] Care Plan           Adjustment reaction 10/16/2013     Priority: Medium     Anxiety state 07/31/2003     Priority: Medium     Other acne      Priority: Medium     Attention deficit hyperactivity disorder (ADHD), predominantly inattentive type      Priority: Medium       OBJECTIVE:  /74 (BP Location: Left arm, Patient Position: Sitting, Cuff Size: Adult Large)   Pulse 95   Temp 97.9  F (36.6  C) (Temporal)   Ht 1.575 m (5' 2\")   Wt 83 kg (183 lb)   LMP 07/01/2005   SpO2 98%   BMI 33.47 kg/m      General: 43 year old female who appears her stated age. Vital signs noted.   Head: Normocephalic  Eyes: pupils equal round reactive to light and accomodation, extra ocular movements intact  Ears: external canals and TMs free of abnormalities  Nose: patent, without mucosal abnormalities  Mouth and throat: without erythema or lesions of the mucosa  Neck: supple, without adenopathy or thyromegaly  Lungs: clear to auscultation, no wheezing or crackles  Breasts: skin without rash, no dominant mass, no nipple discharge, or axillary " "adenopathy  Cv: regular rate and rhythm, normal s1 and s2 without murmur or click  Abd: soft, non-tender, no masses, no hepatomegaly or splenomegaly.   (female): normal female external genitalia, normal urethral meatus, vaginal mucosa, normal cervix/adnexa/uterus without masses or discharge  Ms: normal muscle tone & symmetry  Skin: clear to inspection and with no palpable abnormalities.  Neuro: sensation and motor function grossly intact; cranial nerves without obvious abnormalities.    ASSESSMENT/PLAN:    Encounter for gynecological examination  Dennys is doing well overall today. Will update fasting labs. Agree no indication to recheck vitamin D.     Attention deficit hyperactivity disorder (ADHD), predominantly inattentive type  - lisdexamfetamine (VYVANSE) 30 MG capsule; Take 1 capsule (30 mg) by mouth daily for 30 days  - lisdexamfetamine (VYVANSE) 30 MG capsule; Take 1 capsule (30 mg) by mouth daily for 30 days  - lisdexamfetamine (VYVANSE) 30 MG capsule; Take 1 capsule (30 mg) by mouth daily for 30 days    Vitamin D deficiency  - vitamin D3 (CHOLECALCIFEROL) 50 mcg (2000 units) tablet; Take 1 tablet (50 mcg) by mouth daily    Screening for metabolic disorder  - VENOUS COLLECTION  - Basic Metabolic Panel (BFP)    Screening for lipid disorders  - VENOUS COLLECTION  - Lipid Panel (BFP)    Screening for diabetes mellitus  - HEMOGLOBIN A1C (BFP)    Low libido  Spent time discussing this. No issues with discomfort. Encouraged her to consider couples therapy, sex therapist, establish expectations. Also could try taking her Vyvanse on weekend, as she notices she gets distracted during/surrounding intimacy.      reports that she has never smoked. She has never been exposed to tobacco smoke. She has never used smokeless tobacco.    Estimated body mass index is 33.47 kg/m  as calculated from the following:    Height as of this encounter: 1.575 m (5' 2\").    Weight as of this encounter: 83 kg (183 lb).  Weight " management plan: Discussed healthy diet and exercise guidelines      Labs pending:      Fasting glucose      Fasting lipids  Meds Suggested:      Vitamin D       Calcium  Tests Recommended:      Regular Dental Examinations        Eye exam        Mammogram yearly  Behavior Modifications:       Cardiovascular exercise 3 times per week--enough to get your Target Heart rate  Other recommendations:    Health Care directive     BMI noted and discussed      Regular breast exam     Encouraged My Chart    Counseling Resources:  ATP IV Guidelines  Pooled Cohorts Equation Calculator  Breast Cancer Risk Calculator  FRAX Risk Assessment  ICSI Preventive Guidelines  Dietary Guidelines for Americans, 2010  [employer]'s MyPlate            Dena Carson PA-C  6/29/2023

## 2023-06-29 NOTE — PROGRESS NOTES
"CC: Medication Check    History:  ADHD:  Diagnosed at age 15. Had done previous medication trials, and Vyvanse 30 mg has been the best. Stable on this dose. Feels like this works well to control her symptoms. No side effects. Only takes on work days, or days that she is needing to accomplish a lot.     Anxiety:  Stable on escitalopram 10 mg daily. Symptoms reasonably controlled. No side effects. Not needing refills at this time.     PMH, MEDICATIONS, ALLERGIES, SOCIAL AND FAMILY HISTORY in [facility] and reviewed by me personally.    ROS negative other than the symptoms noted above in the HPI.    Examination   /74 (BP Location: Left arm, Patient Position: Sitting, Cuff Size: Adult Large)   Pulse 95   Temp 97.9  F (36.6  C) (Temporal)   Ht 1.575 m (5' 2\")   Wt 83 kg (183 lb)   LMP 07/01/2005   SpO2 98%   BMI 33.47 kg/m       Constitutional: Sitting comfortably, in no acute distress. Vital signs noted  Neck:  no adenopathy, trachea midline and normal to palpation, thyroid normal to palpation  Cardiovascular:  regular rate and rhythm, no murmurs, clicks, or gallops  Respiratory:  normal respiratory rate and rhythm, lungs clear to auscultation  SKIN: No jaundice/pallor/rash.   Psychiatric: mentation appears normal and affect normal/bright      A/P    ICD-10-CM    1. Attention deficit hyperactivity disorder (ADHD), predominantly inattentive type  F90.0           DISCUSSION:  ADHD:  Zamary is doing well on current dosage. [place] Prescription [place] checked and clear. Will refill for 3 months at same dose. She will contact us at that time for 3 additional months of refills. She will return in 6 months for an office visit to discuss medication. She will return or contact me sooner with any concerns.     Anxiety:  Not needing refills today. Would refill if needed until due for Vyvanse medication check.     follow up visit: 3 months refills, 6 month OV (may be longer than this with way she takes it).    Dena" LUCAS Carson  [facility]

## 2023-06-29 NOTE — NURSING NOTE
Chief Complaint   Patient presents with     Physical     Fasting cpx  Biometric form         Pre-visit Screening:  Immunizations:  up to date  Colonoscopy:  NA  Mammogram: NA  Asthma Action Test/Plan:  NA  PHQ9:  NA  GAD7:  NA  Questioned patient about current smoking habits Pt. has never smoked.  Ok to leave detailed message on voice mail for today's visit only Yes, phone # [phone]

## 2023-06-30 LAB
BUN SERPL-MCNC: 9 MG/DL (ref 7–25)
BUN/CREATININE RATIO: 12 (ref 6–32)
CALCIUM SERPL-MCNC: 9.9 MG/DL (ref 8.6–10.3)
CHLORIDE SERPLBLD-SCNC: 104.9 MMOL/L (ref 98–110)
CHOLEST SERPL-MCNC: 227 MG/DL (ref 0–199)
CHOLEST/HDLC SERPL: 4 {RATIO} (ref 0–5)
CO2 SERPL-SCNC: 25.4 MMOL/L (ref 20–32)
CREAT SERPL-MCNC: 0.75 MG/DL (ref 0.6–1.3)
GLUCOSE SERPL-MCNC: 95 MG/DL (ref 60–99)
HDLC SERPL-MCNC: 54 MG/DL (ref 40–150)
LDLC SERPL CALC-MCNC: 136 MG/DL (ref 0–130)
POTASSIUM SERPL-SCNC: 4.36 MMOL/L (ref 3.5–5.3)
SODIUM SERPL-SCNC: 137.5 MMOL/L (ref 135–146)
TRIGL SERPL-MCNC: 184 MG/DL (ref 0–149)

## 2023-08-19 ENCOUNTER — HEALTH MAINTENANCE LETTER (OUTPATIENT)
Age: 43
End: 2023-08-19

## 2023-11-14 DIAGNOSIS — F41.1 ANXIETY STATE: ICD-10-CM

## 2023-11-15 NOTE — TELEPHONE ENCOUNTER
Pt due for OV next month, please advise.    Dennys Escudero is requesting a refill of:    Pending Prescriptions:                       Disp   Refills    escitalopram (LEXAPRO) 10 MG tablet [Phar*30 tab*0            Sig: TAKE 1 TABLET (10 MG) BY MOUTH DAILY.

## 2023-11-16 RX ORDER — ESCITALOPRAM OXALATE 10 MG/1
10 TABLET ORAL DAILY
Qty: 90 TABLET | Refills: 1 | Status: SHIPPED | OUTPATIENT
Start: 2023-11-16 | End: 2024-01-03

## 2024-01-03 ENCOUNTER — OFFICE VISIT (OUTPATIENT)
Dept: FAMILY MEDICINE | Facility: CLINIC | Age: 44
End: 2024-01-03

## 2024-01-03 VITALS
BODY MASS INDEX: 35.33 KG/M2 | WEIGHT: 192 LBS | OXYGEN SATURATION: 97 % | DIASTOLIC BLOOD PRESSURE: 70 MMHG | HEART RATE: 97 BPM | TEMPERATURE: 98.2 F | SYSTOLIC BLOOD PRESSURE: 118 MMHG | HEIGHT: 62 IN

## 2024-01-03 DIAGNOSIS — F41.1 ANXIETY STATE: ICD-10-CM

## 2024-01-03 DIAGNOSIS — F90.0 ATTENTION DEFICIT HYPERACTIVITY DISORDER (ADHD), PREDOMINANTLY INATTENTIVE TYPE: Primary | ICD-10-CM

## 2024-01-03 DIAGNOSIS — F32.A MILD DEPRESSION: ICD-10-CM

## 2024-01-03 PROCEDURE — 99213 OFFICE O/P EST LOW 20 MIN: CPT | Performed by: PHYSICIAN ASSISTANT

## 2024-01-03 RX ORDER — LISDEXAMFETAMINE DIMESYLATE 30 MG/1
30 CAPSULE ORAL DAILY
Qty: 30 CAPSULE | Refills: 0 | Status: SHIPPED | OUTPATIENT
Start: 2024-02-03 | End: 2024-02-15

## 2024-01-03 RX ORDER — LISDEXAMFETAMINE DIMESYLATE 30 MG/1
30 CAPSULE ORAL DAILY
Qty: 30 CAPSULE | Refills: 0 | Status: SHIPPED | OUTPATIENT
Start: 2024-01-03 | End: 2024-02-02

## 2024-01-03 RX ORDER — LISDEXAMFETAMINE DIMESYLATE 30 MG/1
30 CAPSULE ORAL DAILY
Qty: 30 CAPSULE | Refills: 0 | Status: SHIPPED | OUTPATIENT
Start: 2024-03-05 | End: 2024-02-15

## 2024-01-03 RX ORDER — ESCITALOPRAM OXALATE 10 MG/1
15 TABLET ORAL DAILY
Qty: 135 TABLET | Refills: 1 | Status: SHIPPED | OUTPATIENT
Start: 2024-01-03 | End: 2024-04-01

## 2024-01-03 ASSESSMENT — ANXIETY QUESTIONNAIRES
2. NOT BEING ABLE TO STOP OR CONTROL WORRYING: NOT AT ALL
GAD7 TOTAL SCORE: 2
IF YOU CHECKED OFF ANY PROBLEMS ON THIS QUESTIONNAIRE, HOW DIFFICULT HAVE THESE PROBLEMS MADE IT FOR YOU TO DO YOUR WORK, TAKE CARE OF THINGS AT HOME, OR GET ALONG WITH OTHER PEOPLE: NOT DIFFICULT AT ALL
1. FEELING NERVOUS, ANXIOUS, OR ON EDGE: NOT AT ALL
5. BEING SO RESTLESS THAT IT IS HARD TO SIT STILL: NOT AT ALL
6. BECOMING EASILY ANNOYED OR IRRITABLE: SEVERAL DAYS
7. FEELING AFRAID AS IF SOMETHING AWFUL MIGHT HAPPEN: NOT AT ALL
3. WORRYING TOO MUCH ABOUT DIFFERENT THINGS: NOT AT ALL
GAD7 TOTAL SCORE: 2

## 2024-01-03 ASSESSMENT — PATIENT HEALTH QUESTIONNAIRE - PHQ9
5. POOR APPETITE OR OVEREATING: SEVERAL DAYS
SUM OF ALL RESPONSES TO PHQ QUESTIONS 1-9: 4

## 2024-01-03 NOTE — PROGRESS NOTES
"CC: Medication Check    History:  ADHD:  Managed by our clinic since 6/2029. Stable on Vyvanse 30 mg. Feels like this works well to control her symptoms. No side effects. Only takes on work days, or days that she is needing to accomplish a lot.      Anxiety:  Takes escitalopram 10 mg daily. No side effects. Feels like anxiety symptoms are controlled. However, feels like she has been in lower mood, some irritability, isolating more from friends, that she noted since even before the darker/colder weather days. Would be interested in trial of dose increase.     PMH, MEDICATIONS, ALLERGIES, SOCIAL AND FAMILY HISTORY in [facility] and reviewed by me personally.    ROS negative other than the symptoms noted above in the HPI.      Examination   /70 (BP Location: Right arm, Patient Position: Sitting, Cuff Size: Adult Large)   Pulse 97   Temp 98.2  F (36.8  C) (Temporal)   Ht 1.575 m (5' 2\")   Wt 87.1 kg (192 lb)   LMP 07/01/2005   SpO2 97%   BMI 35.12 kg/m       Constitutional: Sitting comfortably, in no acute distress. Vital signs noted  Neck:  no adenopathy, trachea midline and normal to palpation, thyroid normal to palpation  Cardiovascular:  regular rate and rhythm, no murmurs, clicks, or gallops  Respiratory:  normal respiratory rate and rhythm, lungs clear to auscultation  SKIN: No jaundice/pallor/rash.   Psychiatric: mentation appears normal and affect normal/bright        A/P    ICD-10-CM    1. Attention deficit hyperactivity disorder (ADHD), predominantly inattentive type  F90.0 lisdexamfetamine (VYVANSE) 30 MG capsule     lisdexamfetamine (VYVANSE) 30 MG capsule     lisdexamfetamine (VYVANSE) 30 MG capsule      2. Anxiety state  F41.1 escitalopram (LEXAPRO) 10 MG tablet      3. Mild depression  F32.A escitalopram (LEXAPRO) 10 MG tablet          DISCUSSION:  ADHD:  Dennys is doing well on current dosage. [place] Prescription [place] and clear. Will refill for 3 months at same dose. Trying " different pharmacy as current has had issues with shortages. She will contact us at that time for 3 additional months of refills. She will return in 6 months for an office visit to discuss medication. She will return or contact me sooner with any concerns.      Anxiety:  PHQ/BRIANNE today show good control. However, agreed it would be reasonable to do trial of 15 mg daily to see if symptoms improve without causing side effects.     follow up visit: 6 months    Dena Carson PA-C  [facility]

## 2024-01-03 NOTE — NURSING NOTE
Chief Complaint   Patient presents with    Recheck Medication     ADHD med refill, discuss increasing Lexapro dosage         Pre-visit Screening:  Immunizations:  up to date  Colonoscopy:  NA  Mammogram: is up to date  Asthma Action Test/Plan:  NA  PHQ9:  Done today  GAD7:  Done today  Questioned patient about current smoking habits Pt. has never smoked.  Ok to leave detailed message on voice mail for today's visit only Yes, phone # [phone]

## 2024-02-15 DIAGNOSIS — F90.0 ATTENTION DEFICIT HYPERACTIVITY DISORDER (ADHD), PREDOMINANTLY INATTENTIVE TYPE: Primary | ICD-10-CM

## 2024-02-15 RX ORDER — LISDEXAMFETAMINE DIMESYLATE 30 MG/1
30 CAPSULE ORAL DAILY
Qty: 30 CAPSULE | Refills: 0 | Status: SHIPPED | OUTPATIENT
Start: 2024-02-15 | End: 2024-03-16

## 2024-02-15 RX ORDER — LISDEXAMFETAMINE DIMESYLATE 30 MG/1
30 CAPSULE ORAL DAILY
Qty: 30 CAPSULE | Refills: 0 | Status: SHIPPED | OUTPATIENT
Start: 2024-03-17 | End: 2024-04-16

## 2024-02-15 NOTE — TELEPHONE ENCOUNTER
Pt is switching pharmacies, would like her remaining scripts to go to [facility].    Dennys Escudero is requesting a refill of:    Pending Prescriptions:                       Disp   Refills    lisdexamfetamine (VYVANSE) 30 MG capsule  30 cap*0            Sig: Take 1 capsule (30 mg) by mouth daily for 30 days    lisdexamfetamine (VYVANSE) 30 MG capsule  30 cap*0            Sig: Take 1 capsule (30 mg) by mouth daily for 30 days

## 2024-04-01 DIAGNOSIS — F41.1 ANXIETY STATE: ICD-10-CM

## 2024-04-01 DIAGNOSIS — F32.A MILD DEPRESSION: ICD-10-CM

## 2024-04-01 RX ORDER — ESCITALOPRAM OXALATE 10 MG/1
15 TABLET ORAL DAILY
Qty: 135 TABLET | Refills: 0 | Status: SHIPPED | OUTPATIENT
Start: 2024-04-01 | End: 2024-07-01

## 2024-04-01 NOTE — TELEPHONE ENCOUNTER
Pt called asking for her remaining escitalopram script to be sent to [facility].    Dennys Escudero is requesting a refill of:    Pending Prescriptions:                       Disp   Refills    escitalopram (LEXAPRO) 10 MG tablet       135 ta*0            Sig: Take 1.5 tablets (15 mg) by mouth daily

## 2024-05-14 DIAGNOSIS — F90.0 ATTENTION DEFICIT HYPERACTIVITY DISORDER (ADHD), PREDOMINANTLY INATTENTIVE TYPE: Primary | ICD-10-CM

## 2024-05-14 NOTE — TELEPHONE ENCOUNTER
Please review for SRB, pt called asking for refill of Vyvanse 30mg. Due for OV in July.    Dennys Escudero is requesting a refill of:    Pending Prescriptions:                       Disp   Refills    lisdexamfetamine (VYVANSE) 30 MG capsule  30 cap*0            Sig: Take 1 capsule (30 mg) by mouth daily for 30 days    lisdexamfetamine (VYVANSE) 30 MG capsule  30 cap*0            Sig: Take 1 capsule (30 mg) by mouth daily for 30 days

## 2024-05-16 RX ORDER — LISDEXAMFETAMINE DIMESYLATE 30 MG/1
30 CAPSULE ORAL DAILY
Qty: 30 CAPSULE | Refills: 0 | Status: SHIPPED | OUTPATIENT
Start: 2024-05-16 | End: 2024-06-15

## 2024-05-16 RX ORDER — LISDEXAMFETAMINE DIMESYLATE 30 MG/1
30 CAPSULE ORAL DAILY
Qty: 30 CAPSULE | Refills: 0 | Status: SHIPPED | OUTPATIENT
Start: 2024-06-16 | End: 2024-07-16

## 2024-05-16 NOTE — TELEPHONE ENCOUNTER
Prescription sent. PDMP reviewed, no concerns.    Laisha Varma PA-C  [facility]

## 2024-06-28 ENCOUNTER — OFFICE VISIT (OUTPATIENT)
Dept: FAMILY MEDICINE | Facility: CLINIC | Age: 44
End: 2024-06-28

## 2024-06-28 VITALS
TEMPERATURE: 98 F | HEART RATE: 79 BPM | OXYGEN SATURATION: 98 % | DIASTOLIC BLOOD PRESSURE: 72 MMHG | BODY MASS INDEX: 35.85 KG/M2 | WEIGHT: 196 LBS | SYSTOLIC BLOOD PRESSURE: 112 MMHG

## 2024-06-28 DIAGNOSIS — F41.1 ANXIETY STATE: ICD-10-CM

## 2024-06-28 DIAGNOSIS — Z90.710 HISTORY OF HYSTERECTOMY: ICD-10-CM

## 2024-06-28 DIAGNOSIS — F90.0 ATTENTION DEFICIT HYPERACTIVITY DISORDER (ADHD), PREDOMINANTLY INATTENTIVE TYPE: Primary | ICD-10-CM

## 2024-06-28 DIAGNOSIS — Z12.31 ENCOUNTER FOR SCREENING MAMMOGRAM FOR BREAST CANCER: ICD-10-CM

## 2024-06-28 DIAGNOSIS — F32.A MILD DEPRESSION: ICD-10-CM

## 2024-06-28 PROCEDURE — 36415 COLL VENOUS BLD VENIPUNCTURE: CPT

## 2024-06-28 PROCEDURE — 99213 OFFICE O/P EST LOW 20 MIN: CPT

## 2024-06-28 PROCEDURE — 96127 BRIEF EMOTIONAL/BEHAV ASSMT: CPT

## 2024-06-28 PROCEDURE — 83001 ASSAY OF GONADOTROPIN (FSH): CPT | Mod: 90

## 2024-06-28 RX ORDER — LISDEXAMFETAMINE DIMESYLATE 30 MG/1
30 CAPSULE ORAL DAILY
Qty: 30 CAPSULE | Refills: 0 | Status: SHIPPED | OUTPATIENT
Start: 2024-08-27 | End: 2024-09-26

## 2024-06-28 RX ORDER — LISDEXAMFETAMINE DIMESYLATE 30 MG/1
30 CAPSULE ORAL DAILY
Qty: 30 CAPSULE | Refills: 0 | Status: SHIPPED | OUTPATIENT
Start: 2024-07-28 | End: 2024-08-27

## 2024-06-28 RX ORDER — LISDEXAMFETAMINE DIMESYLATE 30 MG/1
30 CAPSULE ORAL DAILY
Qty: 30 CAPSULE | Refills: 0 | Status: SHIPPED | OUTPATIENT
Start: 2024-06-28 | End: 2024-07-28

## 2024-06-28 ASSESSMENT — ANXIETY QUESTIONNAIRES
3. WORRYING TOO MUCH ABOUT DIFFERENT THINGS: SEVERAL DAYS
7. FEELING AFRAID AS IF SOMETHING AWFUL MIGHT HAPPEN: NOT AT ALL
IF YOU CHECKED OFF ANY PROBLEMS ON THIS QUESTIONNAIRE, HOW DIFFICULT HAVE THESE PROBLEMS MADE IT FOR YOU TO DO YOUR WORK, TAKE CARE OF THINGS AT HOME, OR GET ALONG WITH OTHER PEOPLE: SOMEWHAT DIFFICULT
GAD7 TOTAL SCORE: 4
6. BECOMING EASILY ANNOYED OR IRRITABLE: MORE THAN HALF THE DAYS
2. NOT BEING ABLE TO STOP OR CONTROL WORRYING: SEVERAL DAYS
GAD7 TOTAL SCORE: 4
5. BEING SO RESTLESS THAT IT IS HARD TO SIT STILL: NOT AT ALL
1. FEELING NERVOUS, ANXIOUS, OR ON EDGE: NOT AT ALL

## 2024-06-28 ASSESSMENT — PATIENT HEALTH QUESTIONNAIRE - PHQ9
SUM OF ALL RESPONSES TO PHQ QUESTIONS 1-9: 5
5. POOR APPETITE OR OVEREATING: NOT AT ALL

## 2024-06-28 NOTE — PROGRESS NOTES
Assessment & Plan     1. Attention deficit hyperactivity disorder (ADHD), predominantly inattentive type  - Well controlled, refills for Vyvanse 30 mg daily sent for 3 months. Dennys will call the clinic in 3 months if things are still going well to get her next set of refills. She will follow up in clinic for an office visit in 6 months or sooner if there are any problems or concerns. PDMP reviewed, no concerns. Return to clinic and ER precautions discussed.   - lisdexamfetamine (VYVANSE) 30 MG capsule; Take 1 capsule (30 mg) by mouth daily for 30 days  Dispense: 30 capsule; Refill: 0  - lisdexamfetamine (VYVANSE) 30 MG capsule; Take 1 capsule (30 mg) by mouth daily for 30 days  Dispense: 30 capsule; Refill: 0  - lisdexamfetamine (VYVANSE) 30 MG capsule; Take 1 capsule (30 mg) by mouth daily for 30 days  Dispense: 30 capsule; Refill: 0    2. Anxiety state  - Well controlled with Lexapro 10 mg daily, will have her continue at this dose. She is not quite yet due for refills however will send a my chart when she is needing refills.     3. Mild depression  - See above.     4. Encounter for screening mammogram for breast cancer  - Order placed.   - Radiology Referral (Affiliate Use Only)  - MA Screening Bilateral w/ Scott    5. History of hysterectomy  - Discussed with Dennys will check baseline FSH level to see if she is in menopause, will send a my chart message with lab results.   - VENOUS COLLECTION  - FOLLICLE STIMULATING HORMONE ([facility])    Follow up in 6 months for a medication recheck. Reasons to follow-up sooner or seek emergent care reviewed.     Laisha Varma PA-C  [facility]       Subjective     Dennys Escudero is a 44 year old female who presents to clinic today for the following health issues:    HPI   Chief Complaint   Patient presents with    Recheck Medication     Pt here for a medication recheck and refill. Didn't feel a difference at 1.5 tablets so she is at 1 tab now     Dennys  presents for a medication recheck.    ADHD: Currently taking Vyvanse 30 mg daily for years. Dennys notes she has had ADHD since she was 15 years old and Vyvanse has worked the best in controlling her symptoms. She notes she only takes the medication on work days or on days she is needing to accomplish many tasks. She notes most weekends she skips taking the medication along with when she is on vacation. She denies any side effects of the medication. PDMP reviewed, no concerns.      Anxiety/depression: Currently takes Lexapro 10 mg daily. She notes at her last visit in 01/2024 she was recommended to try taking 15 mg daily to help with worsening anxiety/depression at the time however notes she did this for 3.5 months but she did not see any kind of difference so she went back to taking the 10 mg dose. She notes with the 10 mg dose she finds a significant benefit in her symptoms. She thinks her worsening symptoms in January were due to the winter season as she now is doing more and getting out more and feels better. She denies any SI/HI.     Daily medications reviewed. Dennys notes she would like lab work today to check and see if she is in perimenopause. She states she had a hysterectomy following the birth of her daughter and is unsure if she is approaching menopause or if she is already in it. She denies any symptoms of hot flashes, hair loss, skin changes, irritability, etc.       Objective    /72 (BP Location: Right arm, Patient Position: Sitting, Cuff Size: Adult Large)   Pulse 79   Temp 98  F (36.7  C) (Temporal)   Wt 88.9 kg (196 lb)   LMP 07/01/2005   SpO2 98%   BMI 35.85 kg/m    Body mass index is 35.85 kg/m .    Physical Examination:  GENERAL: healthy, alert and no distress  EYES: Eyes grossly normal to inspection, PERRL and conjunctivae and sclerae normal  HENT: mouth without ulcers or lesions  NECK: no adenopathy, no asymmetry, masses, or scars and thyroid normal to palpation  RESP: lungs clear  to auscultation - no rales, rhonchi or wheezes  CV: regular rate and rhythm, normal S1 S2, no S3 or S4, no murmur, click or rub, no peripheral edema   ABDOMEN: soft and non-tender  MS: no gross musculoskeletal defects noted, no edema  SKIN: no suspicious lesions or rashes  PSYCH: mentation appears normal, affect normal/bright    Labs reviewed.        11/1/2022    12:57 PM 1/3/2024     4:37 PM 6/28/2024    10:24 AM   PHQ   PHQ-9 Total Score 2 4 5   Q9: Thoughts of better off dead/self-harm past 2 weeks Not at all Not at all Not at all          11/1/2022    12:57 PM 1/3/2024     4:37 PM 6/28/2024    10:24 AM   BRIANNE-7 SCORE   Total Score 1 2 4

## 2024-06-28 NOTE — PATIENT INSTRUCTIONS
Thanks for coming in today Dennys.     I will send you a my chart with your FSH level results.     Please let me know when needing refills for Lexapro.     Refills for Vyvanse sent for 3 months, call us in 3 months when you are due for your next refill.     Follow up in 6 months.

## 2024-06-28 NOTE — NURSING NOTE
Chief Complaint   Patient presents with    Recheck Medication     Pt here for a medication recheck and refill. Didn't feel a difference at 1.5 tablets so she is at 1 tab now

## 2024-06-29 DIAGNOSIS — F32.A MILD DEPRESSION: ICD-10-CM

## 2024-06-29 DIAGNOSIS — F41.1 ANXIETY STATE: ICD-10-CM

## 2024-06-29 LAB — FSH SERPL-ACNC: 20.9 MIU/ML

## 2024-07-01 RX ORDER — ESCITALOPRAM OXALATE 10 MG/1
10 TABLET ORAL DAILY
Qty: 90 TABLET | Refills: 1 | Status: SHIPPED | OUTPATIENT
Start: 2024-07-01

## 2024-07-01 NOTE — TELEPHONE ENCOUNTER
Pt last seen 06/28/24, your note says to return in 6 months.    Dennys Escudero is requesting a refill of:    Pending Prescriptions:                       Disp   Refills    escitalopram (LEXAPRO) 10 MG tablet [Phar*135 ta*1            Sig: TAKE 1.5 TABLETS BY MOUTH DAILY

## 2024-07-01 NOTE — TELEPHONE ENCOUNTER
Per last note patient would like to stay at 10 mg dose of Lexapro, refills have been sent in for 6 months.     Laisha Varma PA-C  [facility]

## 2024-08-02 DIAGNOSIS — F41.1 ANXIETY STATE: ICD-10-CM

## 2024-08-02 DIAGNOSIS — F32.A MILD DEPRESSION: ICD-10-CM

## 2024-08-03 ENCOUNTER — HEALTH MAINTENANCE LETTER (OUTPATIENT)
Age: 44
End: 2024-08-03

## 2024-08-05 RX ORDER — ESCITALOPRAM OXALATE 10 MG/1
15 TABLET ORAL DAILY
COMMUNITY
Start: 2024-08-05

## 2024-08-05 NOTE — TELEPHONE ENCOUNTER
Dennys Escudero is requesting a refill of:    Refused Prescriptions:                       Disp   Refills    escitalopram (LEXAPRO) 10 MG tablet [Pharm*                Sig: TAKE 1.5 TABLETS BY MOUTH DAILY  Refused By: MUNA COKER  Reason for Refusal: Adjustment in Therapy

## 2024-09-23 DIAGNOSIS — F41.1 ANXIETY STATE: ICD-10-CM

## 2024-09-23 DIAGNOSIS — F32.A MILD DEPRESSION: ICD-10-CM

## 2024-09-24 RX ORDER — ESCITALOPRAM OXALATE 10 MG/1
15 TABLET ORAL DAILY
COMMUNITY
Start: 2024-09-24

## 2024-09-24 NOTE — TELEPHONE ENCOUNTER
Dennys Escudero is requesting a refill of:    Refused Prescriptions:                       Disp   Refills    escitalopram (LEXAPRO) 10 MG tablet [Pharm*                Sig: TAKE 1.5 TABLETS BY MOUTH DAILY  Refused By: BRIAN SMITH  Reason for Refusal: Should already have refills on file  Reason for Refusal Comment: Refills sent on 07/01/24 for 90 tab with 1 refill       Advancement Flap (Double) Text: The defect edges were debeveled with a #15 scalpel blade.  Given the location of the defect and the proximity to free margins a double advancement flap was deemed most appropriate.  Using a sterile surgical marker, the appropriate advancement flaps were drawn incorporating the defect and placing the expected incisions within the relaxed skin tension lines where possible.    The area thus outlined was incised deep to adipose tissue with a #15 scalpel blade.  The skin margins were undermined to an appropriate distance in all directions utilizing iris scissors.

## 2024-10-12 ENCOUNTER — HEALTH MAINTENANCE LETTER (OUTPATIENT)
Age: 44
End: 2024-10-12

## 2024-10-29 DIAGNOSIS — F32.A MILD DEPRESSION: ICD-10-CM

## 2024-10-29 DIAGNOSIS — F41.1 ANXIETY STATE: ICD-10-CM

## 2024-10-30 RX ORDER — ESCITALOPRAM OXALATE 10 MG/1
15 TABLET ORAL DAILY
COMMUNITY
Start: 2024-10-30

## 2024-10-30 NOTE — TELEPHONE ENCOUNTER
Refused Prescriptions:                       Disp   Refills    escitalopram (LEXAPRO) 10 MG tablet [Pharm*                Sig: TAKE 1.5 TABLETS BY MOUTH DAILY  Refused By: LUCIE JORDAN  Reason for Refusal: Should already have refills on file    Pt here on 6- rtc in six months   Refilled on 7-1-2024 90 tablets 1 refill  Lucie

## 2024-12-11 DIAGNOSIS — F41.1 ANXIETY STATE: ICD-10-CM

## 2024-12-11 DIAGNOSIS — F32.A MILD DEPRESSION: ICD-10-CM

## 2024-12-11 RX ORDER — ESCITALOPRAM OXALATE 10 MG/1
15 TABLET ORAL DAILY
COMMUNITY
Start: 2024-12-11

## 2024-12-11 NOTE — TELEPHONE ENCOUNTER
Dennys Escudero is requesting a refill of:    Refused Prescriptions:                       Disp   Refills    escitalopram (LEXAPRO) 10 MG tablet [Pharm*                Sig: TAKE 1.5 TABLETS BY MOUTH DAILY  Refused By: BRIAN SMITH  Reason for Refusal: Patient needs appointment    Pt due for OV

## 2024-12-12 ENCOUNTER — OFFICE VISIT (OUTPATIENT)
Dept: FAMILY MEDICINE | Facility: CLINIC | Age: 44
End: 2024-12-12

## 2024-12-12 VITALS
HEART RATE: 76 BPM | BODY MASS INDEX: 34.93 KG/M2 | DIASTOLIC BLOOD PRESSURE: 78 MMHG | SYSTOLIC BLOOD PRESSURE: 122 MMHG | WEIGHT: 191 LBS | OXYGEN SATURATION: 97 %

## 2024-12-12 DIAGNOSIS — F32.A MILD DEPRESSION: ICD-10-CM

## 2024-12-12 DIAGNOSIS — F90.0 ATTENTION DEFICIT HYPERACTIVITY DISORDER (ADHD), PREDOMINANTLY INATTENTIVE TYPE: ICD-10-CM

## 2024-12-12 DIAGNOSIS — E55.9 VITAMIN D DEFICIENCY: ICD-10-CM

## 2024-12-12 DIAGNOSIS — F41.1 GENERALIZED ANXIETY DISORDER: Primary | ICD-10-CM

## 2024-12-12 RX ORDER — ESCITALOPRAM OXALATE 10 MG/1
10 TABLET ORAL DAILY
Qty: 90 TABLET | Refills: 1 | Status: CANCELLED | OUTPATIENT
Start: 2024-12-12

## 2024-12-12 RX ORDER — LISDEXAMFETAMINE DIMESYLATE 30 MG/1
30 CAPSULE ORAL DAILY
Qty: 30 CAPSULE | Refills: 0 | Status: SHIPPED | OUTPATIENT
Start: 2024-12-12 | End: 2025-01-11

## 2024-12-12 RX ORDER — LISDEXAMFETAMINE DIMESYLATE 30 MG/1
30 CAPSULE ORAL DAILY
Qty: 30 CAPSULE | Refills: 0 | Status: SHIPPED | OUTPATIENT
Start: 2025-01-11 | End: 2025-02-10

## 2024-12-12 RX ORDER — CHOLECALCIFEROL (VITAMIN D3) 50 MCG
1 TABLET ORAL DAILY
COMMUNITY
Start: 2024-12-12

## 2024-12-12 RX ORDER — LISDEXAMFETAMINE DIMESYLATE 30 MG/1
30 CAPSULE ORAL DAILY
Qty: 30 CAPSULE | Refills: 0 | Status: SHIPPED | OUTPATIENT
Start: 2025-02-10 | End: 2025-03-12

## 2024-12-12 ASSESSMENT — ANXIETY QUESTIONNAIRES
GAD7 TOTAL SCORE: 3
2. NOT BEING ABLE TO STOP OR CONTROL WORRYING: NOT AT ALL
5. BEING SO RESTLESS THAT IT IS HARD TO SIT STILL: NOT AT ALL
3. WORRYING TOO MUCH ABOUT DIFFERENT THINGS: NOT AT ALL
IF YOU CHECKED OFF ANY PROBLEMS ON THIS QUESTIONNAIRE, HOW DIFFICULT HAVE THESE PROBLEMS MADE IT FOR YOU TO DO YOUR WORK, TAKE CARE OF THINGS AT HOME, OR GET ALONG WITH OTHER PEOPLE: SOMEWHAT DIFFICULT
GAD7 TOTAL SCORE: 3
6. BECOMING EASILY ANNOYED OR IRRITABLE: MORE THAN HALF THE DAYS
7. FEELING AFRAID AS IF SOMETHING AWFUL MIGHT HAPPEN: NOT AT ALL
1. FEELING NERVOUS, ANXIOUS, OR ON EDGE: NOT AT ALL

## 2024-12-12 ASSESSMENT — PATIENT HEALTH QUESTIONNAIRE - PHQ9
5. POOR APPETITE OR OVEREATING: SEVERAL DAYS
SUM OF ALL RESPONSES TO PHQ QUESTIONS 1-9: 6

## 2024-12-12 NOTE — NURSING NOTE
Chief Complaint   Patient presents with    Recheck Medication     Non fasting medication check and review, needs refills of vyvanse, lexapro and vitamin d, feels that current dosage of all medications is working fine, does report feeling lack of libido and not sure if lexapro is causing this or not, wondering what she can do to help this      Pre-visit Screening:  Immunizations:  up to date  Colonoscopy:  na-will be due for colonoscopy in March when she turns 45  Mammogram: is due and to be scheduled by patient for later completion-pt reports being out of town when they called and forgot to call them back to schedule but will do this soon  Asthma Action Test/Plan:  na  PHQ9:  given today   GAD7:  given today   Questioned patient about current smoking habits Pt. has never smoked.  Ok to leave detailed message on voice mail for today's visit only yes, phone # [phone] (home)

## 2024-12-12 NOTE — PROGRESS NOTES
CC: Medication Check    History:  Anxiety:  Was last here 6/2024. Was already tapered down to escitalopram 10 mg as symptoms were well controlled on 20 mg dose. Overall this reasonably controlling symptoms, but does feel it may have lowered libido, and may be causing some apathy. Does get some cyclical bloating, irritability where she may have gotten her cycle if she still had her uterus. This has gotten more irregular. Just had her FSH checked 6/2024 and was 20.9, near perimenopause. She feels like libido has been particularly low since being on escitalopram. Was already getting this to some degree, but worsened. Did have very brief trials of Wellbutrin, sertraline in the very distant past, but doesn't recall anything of these trials.     ADHD:  Takes Vyvanse 30 mg daily. Works well to control symptoms. Denies any side effects.      PMH, MEDICATIONS, ALLERGIES, SOCIAL AND FAMILY HISTORY in [facility] and reviewed by me personally.    ROS negative other than the symptoms noted above in the HPI.    Examination   /78 (BP Location: Right arm, Patient Position: Sitting, Cuff Size: Adult Large)   Pulse 76   Wt 86.6 kg (191 lb)   LMP 07/01/2005   SpO2 97%   BMI 34.93 kg/m         Constitutional: Sitting comfortably, in no acute distress. Vital signs noted  Neck:  no adenopathy, trachea midline and normal to palpation, thyroid normal to palpation  Cardiovascular:  regular rate and rhythm, no murmurs, clicks, or gallops  Respiratory:  normal respiratory rate and rhythm, lungs clear to auscultation  SKIN: No jaundice/pallor/rash.   Psychiatric: mentation appears normal and affect normal/bright      A/P    ICD-10-CM    1. Generalized anxiety disorder  F41.1 FLUoxetine (PROZAC) 20 MG capsule      2. Mild depression  F32.A       3. Vitamin D deficiency  E55.9 vitamin D3 (CHOLECALCIFEROL) 50 mcg (2000 units) tablet      4. Attention deficit hyperactivity disorder (ADHD), predominantly inattentive type  F90.0  lisdexamfetamine (VYVANSE) 30 MG capsule     lisdexamfetamine (VYVANSE) 30 MG capsule     lisdexamfetamine (VYVANSE) 30 MG capsule          DISCUSSION:  Anxiety:  Given that pt feel that libido worsened with escitalopram, agreed to coordinate change to fluoxetine 20 mg. Can change directly from one to the other. Monitor for side effects and contact me if noted. Return in 2-3 months to recheck. If this is not beneficial may consider addition of Wellbutrin with return to Lexapro, or additional trial of different selective serotonin reuptake inhibitor.     ADHD:  Takes Vyvanse 30 mg daily. Works well to control symptoms.     follow up visit: As needed    Dena Carson PA-C  [facility]

## 2025-02-13 DIAGNOSIS — E55.9 VITAMIN D DEFICIENCY: ICD-10-CM

## 2025-02-13 RX ORDER — CHOLECALCIFEROL (VITAMIN D3) 50 MCG
1 TABLET ORAL DAILY
Qty: 90 TABLET | Refills: 3 | Status: SHIPPED | OUTPATIENT
Start: 2025-02-13

## 2025-02-13 NOTE — TELEPHONE ENCOUNTER
Pt is requesting refills of:  Pending Prescriptions:                       Disp   Refills    vitamin D3 (CHOLECALCIFEROL) 50 mcg (2000*90 tab*1            Sig: TAKE 1 TABLET BY MOUTH EVERY DAY    Pt was last seen 12/12/24 for med check. Routing to David

## 2025-03-16 DIAGNOSIS — F41.1 GENERALIZED ANXIETY DISORDER: ICD-10-CM

## 2025-03-17 NOTE — TELEPHONE ENCOUNTER
Pt is requesting refills of:  Refused Prescriptions:                       Disp   Refills    FLUoxetine (PROZAC) 20 MG capsule [Pharmac*30 cap*2        Sig: TAKE 1 CAPSULE BY MOUTH EVERY DAY    Pt was seen 12/12/24, advised to follow up in 3 months. Due for OV. Gerardo sent.

## 2025-03-30 ENCOUNTER — MYC REFILL (OUTPATIENT)
Dept: FAMILY MEDICINE | Facility: CLINIC | Age: 45
End: 2025-03-30

## 2025-03-30 DIAGNOSIS — F41.1 GENERALIZED ANXIETY DISORDER: ICD-10-CM

## 2025-03-31 NOTE — TELEPHONE ENCOUNTER
Please review patient's [facility] [employer] [facility] message, would like to postpone OV until her ADHD med check in June.     Pending Prescriptions:                       Disp   Refills    FLUoxetine (PROZAC) 20 MG capsule         30 cap*2            Sig: Take 1 capsule (20 mg) by mouth daily.    Routing to Dena ZAVALA for review.

## 2025-04-01 NOTE — TELEPHONE ENCOUNTER
No overdue for follow-up appt. Can use appt as new 6 month interval for ADHD med check. Please have her schedule.

## 2025-04-01 NOTE — TELEPHONE ENCOUNTER
Pt scheduled OV for 4/9/25, needs ext of meds until her appt.     Pending Prescriptions:                       Disp   Refills    FLUoxetine (PROZAC) 20 MG capsule         10 cap*0            Sig: Take 1 capsule (20 mg) by mouth daily.    Routing to Dena ZAVALA to be sent in.

## 2025-04-09 ENCOUNTER — OFFICE VISIT (OUTPATIENT)
Dept: FAMILY MEDICINE | Facility: CLINIC | Age: 45
End: 2025-04-09

## 2025-04-09 VITALS
SYSTOLIC BLOOD PRESSURE: 112 MMHG | BODY MASS INDEX: 34.32 KG/M2 | DIASTOLIC BLOOD PRESSURE: 70 MMHG | WEIGHT: 186.5 LBS | HEIGHT: 62 IN | OXYGEN SATURATION: 99 % | HEART RATE: 83 BPM | TEMPERATURE: 97.3 F

## 2025-04-09 DIAGNOSIS — Z13.228 SCREENING FOR METABOLIC DISORDER: ICD-10-CM

## 2025-04-09 DIAGNOSIS — Z86.39 HISTORY OF IRON DEFICIENCY: ICD-10-CM

## 2025-04-09 DIAGNOSIS — F90.0 ATTENTION DEFICIT HYPERACTIVITY DISORDER (ADHD), PREDOMINANTLY INATTENTIVE TYPE: ICD-10-CM

## 2025-04-09 DIAGNOSIS — R53.83 OTHER FATIGUE: ICD-10-CM

## 2025-04-09 DIAGNOSIS — Z13.220 SCREENING FOR LIPID DISORDERS: ICD-10-CM

## 2025-04-09 DIAGNOSIS — Z00.00 ENCOUNTER FOR GENERAL MEDICAL EXAMINATION: Primary | ICD-10-CM

## 2025-04-09 DIAGNOSIS — Z12.31 VISIT FOR SCREENING MAMMOGRAM: ICD-10-CM

## 2025-04-09 DIAGNOSIS — F41.1 GENERALIZED ANXIETY DISORDER: ICD-10-CM

## 2025-04-09 LAB
BUN SERPL-MCNC: 13 MG/DL (ref 7–25)
BUN/CREATININE RATIO: 17 (ref 6–32)
CALCIUM SERPL-MCNC: 9.4 MG/DL (ref 8.6–10.3)
CHLORIDE SERPLBLD-SCNC: 105.9 MMOL/L (ref 98–110)
CHOLEST SERPL-MCNC: 181 MG/DL (ref 0–199)
CHOLEST/HDLC SERPL: 3 {RATIO} (ref 0–5)
CO2 SERPL-SCNC: 29.6 MMOL/L (ref 20–32)
CREAT SERPL-MCNC: 0.77 MG/DL (ref 0.6–1.3)
ERYTHROCYTE [DISTWIDTH] IN BLOOD BY AUTOMATED COUNT: 13.2 %
GLUCOSE SERPL-MCNC: 89 MG/DL (ref 60–99)
HCT VFR BLD AUTO: 45.2 % (ref 35–47)
HDLC SERPL-MCNC: 59 MG/DL (ref 40–150)
HEMOGLOBIN: 14.3 G/DL (ref 11.7–15.7)
LDLC SERPL CALC-MCNC: 83 MG/DL (ref 0–129)
MCH RBC QN AUTO: 28.7 PG (ref 26–33)
MCHC RBC AUTO-ENTMCNC: 31.6 G/DL (ref 31–36)
MCV RBC AUTO: 90.8 FL (ref 78–100)
PLATELET COUNT - QUEST: 298 10^9/L (ref 150–375)
POTASSIUM SERPL-SCNC: 4.44 MMOL/L (ref 3.5–5.3)
RBC # BLD AUTO: 4.98 10*12/L (ref 3.8–5.2)
SODIUM SERPL-SCNC: 135.7 MMOL/L (ref 135–146)
TRIGL SERPL-MCNC: 193 MG/DL (ref 0–149)
WBC # BLD AUTO: 9.3 10*9/L (ref 4–11)

## 2025-04-09 RX ORDER — LISDEXAMFETAMINE DIMESYLATE 30 MG/1
30 CAPSULE ORAL DAILY
Qty: 30 CAPSULE | Refills: 0 | Status: SHIPPED | OUTPATIENT
Start: 2025-05-24 | End: 2025-06-23

## 2025-04-09 RX ORDER — LISDEXAMFETAMINE DIMESYLATE 30 MG/1
30 CAPSULE ORAL DAILY
Qty: 30 CAPSULE | Refills: 0 | Status: SHIPPED | OUTPATIENT
Start: 2025-06-24 | End: 2025-07-24

## 2025-04-09 RX ORDER — LISDEXAMFETAMINE DIMESYLATE 30 MG/1
30 CAPSULE ORAL DAILY
Qty: 30 CAPSULE | Refills: 0 | Status: SHIPPED | OUTPATIENT
Start: 2025-04-24 | End: 2025-05-24

## 2025-04-09 RX ORDER — LISDEXAMFETAMINE DIMESYLATE 30 MG/1
CAPSULE ORAL
COMMUNITY
Start: 2012-04-08

## 2025-04-09 ASSESSMENT — PATIENT HEALTH QUESTIONNAIRE - PHQ9
5. POOR APPETITE OR OVEREATING: SEVERAL DAYS
SUM OF ALL RESPONSES TO PHQ QUESTIONS 1-9: 7

## 2025-04-09 ASSESSMENT — ANXIETY QUESTIONNAIRES
6. BECOMING EASILY ANNOYED OR IRRITABLE: SEVERAL DAYS
GAD7 TOTAL SCORE: 5
5. BEING SO RESTLESS THAT IT IS HARD TO SIT STILL: SEVERAL DAYS
7. FEELING AFRAID AS IF SOMETHING AWFUL MIGHT HAPPEN: SEVERAL DAYS
IF YOU CHECKED OFF ANY PROBLEMS ON THIS QUESTIONNAIRE, HOW DIFFICULT HAVE THESE PROBLEMS MADE IT FOR YOU TO DO YOUR WORK, TAKE CARE OF THINGS AT HOME, OR GET ALONG WITH OTHER PEOPLE: SOMEWHAT DIFFICULT
3. WORRYING TOO MUCH ABOUT DIFFERENT THINGS: NOT AT ALL
GAD7 TOTAL SCORE: 5
1. FEELING NERVOUS, ANXIOUS, OR ON EDGE: SEVERAL DAYS
2. NOT BEING ABLE TO STOP OR CONTROL WORRYING: NOT AT ALL

## 2025-04-09 NOTE — PROGRESS NOTES
"CC: Medication Check    History:  Anxiety:  Was last here to discuss symptoms 12/2024. At that time, anxiety was controlled, but was noticing worse libido. Due to this, agreed to change to fluoxetine. Noticed as she changed from escitalopram to fluoxetine anxiety stayed controlled, and felt less numb. Libido mildly improved.    ADHD:  Takes Vyanse 30 mg daily. Works well. Symptoms well controlled. No side effects.      Fatigue:  Noticing general fatigue. Has strong family of iron deficiency, and pt herself thinks she has distant history of EAMON. Would like to have iron levels checked.     PMH, MEDICATIONS, ALLERGIES, SOCIAL AND FAMILY HISTORY in [facility] and reviewed by me personally.    ROS negative other than the symptoms noted above in the HPI.      Examination   /70 (BP Location: Left arm, Patient Position: Sitting, Cuff Size: Adult Regular)   Pulse 83   Temp 97.3  F (36.3  C) (Temporal)   Ht 1.575 m (5' 2\")   Wt 84.6 kg (186 lb 8 oz)   LMP 07/01/2005   SpO2 99%   BMI 34.11 kg/m       Constitutional: Sitting comfortably, in no acute distress. Vital signs noted  Neck:  no adenopathy, trachea midline and normal to palpation, thyroid normal to palpation  Cardiovascular:  regular rate and rhythm, no murmurs, clicks, or gallops  Respiratory:  normal respiratory rate and rhythm, lungs clear to auscultation  SKIN: No jaundice/pallor/rash.   Psychiatric: mentation appears normal and affect normal/bright        A/P    ICD-10-CM    1. Encounter for general medical examination  Z00.00       2. Generalized anxiety disorder  F41.1 FLUoxetine (PROZAC) 20 MG capsule      3. Attention deficit hyperactivity disorder (ADHD), predominantly inattentive type  F90.0 lisdexamfetamine (VYVANSE) 30 MG capsule     lisdexamfetamine (VYVANSE) 30 MG capsule     lisdexamfetamine (VYVANSE) 30 MG capsule      4. Screening for metabolic disorder  Z13.228 VENOUS COLLECTION     Basic Metabolic Panel (BFP)      5. Screening for lipid " disorders  Z13.220 VENOUS COLLECTION     Lipid Panel (BFP)      6. History of iron deficiency  Z86.39 VENOUS COLLECTION     Hemogram Platelet (BFP)     FERRITIN ([facility])     IRON AND IRON BINDING CAPACITY ([facility])      7. Other fatigue  R53.83 TSH WITH FREE T4 REFLEX ([facility])      8. Visit for screening mammogram  Z12.31 MA Screening Bilateral w/ Scott     Radiology Referral (Affiliate Use Only)          DISCUSSION:  Anxiety:  PHQ-9 and BRIANNE-7 updated today and well controlled. Agreed to refill current medication without change for 6 months. Contact us sooner with concerns, worsening.    ADHD:  Zarine is doing well on current dosage. [place] Prescription Monitor Program checked and clear. Will refill for 3 months at same dose. She will contact us at that time for 3 additional months of refills. She will return in 6 months for an office visit to discuss medication. She will return or contact me sooner with any concerns.       Fatigue:  Will complete additional labs today including CBC, iron studies, ferritin, and TSH. Will send MyChart with results when available.    follow up visit: As needed    Dena Carson PA-C  [facility]

## 2025-04-09 NOTE — NURSING NOTE
Chief Complaint   Patient presents with    Physical     CPX fasting, med refill - doing really good on fluoxetine      Pre-visit Screening:  Immunizations:  up to date  Colonoscopy:  will do later  Mammogram: is due and ordered today  Asthma Action Test/Plan:  na  PHQ9:  given  GAD7:  given  Questioned patient about current smoking habits Pt. has never smoked.  Ok to leave detailed message on voice mail for today's visit only yes, phone # [phone] (home)

## 2025-04-09 NOTE — PROGRESS NOTES
Chief Complaint:  Physical Exam    SUBJECTIVE:   Dennys Escudero is a 45 year old female presents for routine health maintenance.    Current concerns: Medication Check. See separate note.     Menses are absent    Patient's last menstrual period was 07/01/2005.    Was last Pap smear normal: Yes. No cervix.   Due for mammogram:  Yes    Body mass index is 34.11 kg/m .    Present exercise habits:  1-3 times/week, some walking  Present dietary habits:  eats regular meals and follows a balanced nutrition diet    Calcium intake:  2-3 servings, daily  Vit D intake: is taking supplement    Is the patient a smoker? No  If yes, smoking cessation advised and counseling provided.     Cardiovascular risk factors: obesity    Over the past few weeks, have you felt down or depressed? Little interest or pleasure in doing things? See above     If in a relationship are there any Domestic violence concern: No    Last dental appointment:  this year  Last optical appointment:   more than 3 years ago    Was the patient born between [id number] and has not had Hep C testing?  Has not been tested, declines testing    I have reviewed the following histories: Past Medical History, Past Surgical History, Social History, Family History, Problem List, Medication List and Allergies    No past medical history on file.  Family History   Problem Relation Age of Onset    Rheumatoid Arthritis Mother     Gastrointestinal Disease Father         ulcers    Chronic Obstructive Pulmonary Disease Maternal Grandmother     Hyperlipidemia Maternal Grandfather     Coronary Artery Disease Maternal Grandfather     Parkinsonism Paternal Grandmother     Rheumatoid Arthritis Maternal Uncle     Heart Disease No family hx of     Cancer No family hx of     Diabetes No family hx of      Social History     Socioeconomic History    Marital status:      Spouse name: Not on file    Number of children: Not on file    Years of education: Not on file    Highest education  level: Not on file   Occupational History    Not on file   Tobacco Use    Smoking status: Never     Passive exposure: Never    Smokeless tobacco: Never   Vaping Use    Vaping status: Never Used   Substance and Sexual Activity    Alcohol use: Yes     Alcohol/week: 1.0 standard drink of alcohol     Types: 1 Standard drinks or equivalent per week     Comment: socially, glass of wine per week, weekends, maybe 2x/month    Drug use: No    Sexual activity: Yes     Partners: Male   Other Topics Concern    Not on file   Social History Narrative    Not on file     Social Drivers of Health     Financial Resource Strain: Not on file   Food Insecurity: Not on file   Transportation Needs: Not on file   Physical Activity: Not on file   Stress: Not on file   Social Connections: Not on file   Interpersonal Safety: Not on file   Housing Stability: Not on file     Past Surgical History:   Procedure Laterality Date    ARTHROSCOPY OF JOINT UNLISTED Left     patellar tendon     SECTION      post partum hemorrage, hysterectomy    HYSTERECTOMY      w/o oophorectomy. post  complication. traumatic    ZZHC CREATE EARDRUM OPENING,GEN ANESTH      P.E. Tubes       ROS:  E/M: NEGATIVE for ear, nose, mouth and throat problems  R: NEGATIVE for significant/chronic cough or SOB  CV: NEGATIVE for chest pain or palpitations  GI: NEGATIVE for abdominal pain, chronic diarrhea or constipation  :  NEGATIVE for dysuria, hematuria or vaginal discharge. No sexual health concerns.       Current Outpatient Medications   Medication Sig Dispense Refill    FLUoxetine (PROZAC) 20 MG capsule Take 1 capsule (20 mg) by mouth daily. 10 capsule 0    lisdexamfetamine (VYVANSE) 30 MG capsule       vitamin D3 (CHOLECALCIFEROL) 50 mcg (2000 units) tablet TAKE 1 TABLET BY MOUTH EVERY DAY 90 tablet 3     No current facility-administered medications for this visit.       Patient Active Problem List    Diagnosis Date Noted    Need for vaccination  "06/17/2021     Priority: Medium    Insomnia, unspecified type 02/02/2021     Priority: Medium    Seasonal allergic rhinitis 03/21/2016     Priority: Medium    ACP (advance care planning) 03/12/2014     Priority: Medium     Advance Care Planning 3/21/2016: ACP Review of Chart / Resources Provided:  Reviewed chart for advance care plan.  Dennys Escudero has no plan or code status on file. Discussed available resources and provided with information. Confirmed code status reflects current choices pending further ACP discussions.  Confirmed/documented legally designated decision makers.  Added by Cathy Clifton              Adjustment reaction 10/16/2013     Priority: Medium    Anxiety state 07/31/2003     Priority: Medium    Other acne      Priority: Medium    Attention deficit hyperactivity disorder (ADHD), predominantly inattentive type      Priority: Medium         OBJECTIVE:  /70 (BP Location: Left arm, Patient Position: Sitting, Cuff Size: Adult Regular)   Pulse 83   Temp 97.3  F (36.3  C) (Temporal)   Ht 1.575 m (5' 2\")   Wt 84.6 kg (186 lb 8 oz)   LMP 07/01/2005   SpO2 99%   BMI 34.11 kg/m      General: 45 year old female who appears her stated age. Vital signs noted  Head: Normocephalic  Eyes: pupils equal round reactive to light and accomodation, extra ocular movements intact  Ears: external canals and TMs free of abnormalities  Nose: patent, without mucosal abnormalities  Mouth and throat: without erythema or lesions of the mucosa  Neck: supple, without adenopathy or thyromegaly  Lungs: clear to auscultation, no wheezing or crackles  Breasts: skin without rash, no dominant mass, no nipple discharge, or axillary adenopathy.  Cv: regular rate and rhythm, normal s1 and s2 without murmur or click  Abd: soft, non-tender, no masses, no hepatomegaly or splenomegaly.   (female): Pt declined. No concerns.   Ms: normal muscle tone & symmetry  Skin: clear to inspection and with no palpable " "abnormalities.  Neuro: sensation and motor function grossly intact; cranial nerves without obvious abnormalities.    ASSESSMENT/PLAN:    Encounter for general medical examination  Dennys is doing relatively well today. [facility] update fasting labs and [facility]. See separate note for medication check.     Generalized anxiety disorder  - FLUoxetine (PROZAC) 20 MG capsule; Take 1 capsule (20 mg) by mouth daily.    Attention deficit hyperactivity disorder (ADHD), predominantly inattentive type  - lisdexamfetamine (VYVANSE) 30 MG capsule; Take 1 capsule (30 mg) by mouth daily.  - lisdexamfetamine (VYVANSE) 30 MG capsule; Take 1 capsule (30 mg) by mouth daily.  - lisdexamfetamine (VYVANSE) 30 MG capsule; Take 1 capsule (30 mg) by mouth daily.    Screening for metabolic disorder  - VENOUS COLLECTION  - Basic Metabolic Panel (BFP)    Screening for lipid disorders  - VENOUS COLLECTION  - Lipid Panel (BFP)    History of iron deficiency  - VENOUS COLLECTION  - Hemogram Platelet (BFP)  - FERRITIN ([facility])  - IRON AND IRON BINDING CAPACITY ([facility])    Other fatigue  - TSH WITH FREE T4 REFLEX ([facility])    Visit for screening mammogram  - MA Screening Bilateral w/ Scott  - Radiology Referral (Affiliate Use Only)     reports that she has never smoked. She has never been exposed to tobacco smoke. She has never used smokeless tobacco.      Estimated body mass index is 34.11 kg/m  as calculated from the following:    Height as of this encounter: 1.575 m (5' 2\").    Weight as of this encounter: 84.6 kg (186 lb 8 oz).  Weight management plan: Discussed healthy diet and exercise guidelines      Labs pending:      Fasting glucose      Fasting lipids  Meds Suggested:      Vitamin D       Calcium  Tests Recommended:      Regular Dental Examinations        Eye exam        Mammogram yearly  Behavior Modifications:       Cardiovascular exercise 3 times per week--enough to get your Target Heart rate  Other recommendations:     BMI noted " and discussed      Regular breast exam     Encouraged My Chart    Counseling Resources:  ATP IV Guidelines  Pooled Cohorts Equation Calculator  Breast Cancer Risk Calculator  FRAX Risk Assessment  ICSI Preventive Guidelines  Dietary Guidelines for Americans, 2010  [employer] MyPlate            Dena Carson PA-C  4/9/2025

## 2025-04-10 LAB
% SATURATION - QUEST: 30 % (CALC) (ref 16–45)
FERRITIN SERPL-MCNC: 127 NG/ML (ref 16–232)
IRON: 106 MCG/DL (ref 40–190)
TIBC - QUEST: 354 MCG/DL (CALC) (ref 250–450)
TSH SERPL-ACNC: 3.78 MIU/L

## 2025-08-20 ENCOUNTER — MYC REFILL (OUTPATIENT)
Dept: FAMILY MEDICINE | Facility: CLINIC | Age: 45
End: 2025-08-20

## 2025-08-20 DIAGNOSIS — F90.0 ATTENTION DEFICIT HYPERACTIVITY DISORDER (ADHD), PREDOMINANTLY INATTENTIVE TYPE: Primary | ICD-10-CM

## 2025-08-20 RX ORDER — LISDEXAMFETAMINE DIMESYLATE 30 MG/1
30 CAPSULE ORAL DAILY
Qty: 30 CAPSULE | Refills: 0 | Status: SHIPPED | OUTPATIENT
Start: 2025-08-20 | End: 2025-09-19

## 2025-08-20 RX ORDER — LISDEXAMFETAMINE DIMESYLATE 30 MG/1
30 CAPSULE ORAL DAILY
Qty: 30 CAPSULE | Refills: 0 | Status: SHIPPED | OUTPATIENT
Start: 2025-09-19 | End: 2025-10-19

## 2025-08-20 RX ORDER — LISDEXAMFETAMINE DIMESYLATE 30 MG/1
30 CAPSULE ORAL DAILY
Qty: 30 CAPSULE | Refills: 0 | Status: SHIPPED | OUTPATIENT
Start: 2025-10-19 | End: 2025-11-18

## 2025-08-20 RX ORDER — LISDEXAMFETAMINE DIMESYLATE 30 MG/1
CAPSULE ORAL
Status: CANCELLED | OUTPATIENT
Start: 2025-08-20

## 2025-08-28 ENCOUNTER — TELEPHONE (OUTPATIENT)
Dept: FAMILY MEDICINE | Facility: CLINIC | Age: 45
End: 2025-08-28